# Patient Record
Sex: MALE | Race: WHITE | NOT HISPANIC OR LATINO | Employment: FULL TIME | URBAN - METROPOLITAN AREA
[De-identification: names, ages, dates, MRNs, and addresses within clinical notes are randomized per-mention and may not be internally consistent; named-entity substitution may affect disease eponyms.]

---

## 2017-02-27 ENCOUNTER — TRANSCRIBE ORDERS (OUTPATIENT)
Dept: LAB | Facility: CLINIC | Age: 37
End: 2017-02-27

## 2017-02-27 ENCOUNTER — APPOINTMENT (OUTPATIENT)
Dept: LAB | Facility: CLINIC | Age: 37
End: 2017-02-27
Payer: COMMERCIAL

## 2017-02-27 DIAGNOSIS — D86.2 SARCOIDOSIS OF LUNG WITH SARCOIDOSIS OF LYMPH NODES (HCC): ICD-10-CM

## 2017-02-27 DIAGNOSIS — D86.89 HEERFORDT'S SYNDROME: ICD-10-CM

## 2017-02-27 DIAGNOSIS — E55.9 UNSPECIFIED VITAMIN D DEFICIENCY: ICD-10-CM

## 2017-02-27 DIAGNOSIS — H57.13 PAIN OF BOTH EYES: ICD-10-CM

## 2017-02-27 DIAGNOSIS — L98.9 UNSPECIFIED DISORDER OF SKIN AND SUBCUTANEOUS TISSUE: ICD-10-CM

## 2017-02-27 DIAGNOSIS — D86.2 SARCOIDOSIS OF LUNG WITH SARCOIDOSIS OF LYMPH NODES (HCC): Primary | ICD-10-CM

## 2017-02-27 LAB
25(OH)D3 SERPL-MCNC: 19.5 NG/ML (ref 30–100)
ALBUMIN SERPL BCP-MCNC: 4.1 G/DL (ref 3.5–5)
ALT SERPL W P-5'-P-CCNC: 36 U/L (ref 12–78)
ANION GAP SERPL CALCULATED.3IONS-SCNC: 6 MMOL/L (ref 4–13)
AST SERPL W P-5'-P-CCNC: 18 U/L (ref 5–45)
BASOPHILS # BLD AUTO: 0.08 THOUSANDS/ΜL (ref 0–0.1)
BASOPHILS NFR BLD AUTO: 1 % (ref 0–1)
BUN SERPL-MCNC: 17 MG/DL (ref 5–25)
CALCIUM SERPL-MCNC: 9.6 MG/DL (ref 8.3–10.1)
CHLORIDE SERPL-SCNC: 103 MMOL/L (ref 100–108)
CO2 SERPL-SCNC: 30 MMOL/L (ref 21–32)
CREAT SERPL-MCNC: 0.83 MG/DL (ref 0.6–1.3)
EOSINOPHIL # BLD AUTO: 0.39 THOUSAND/ΜL (ref 0–0.61)
EOSINOPHIL NFR BLD AUTO: 5 % (ref 0–6)
ERYTHROCYTE [DISTWIDTH] IN BLOOD BY AUTOMATED COUNT: 13.8 % (ref 11.6–15.1)
ERYTHROCYTE [SEDIMENTATION RATE] IN BLOOD: 2 MM/HOUR (ref 0–10)
GFR SERPL CREATININE-BSD FRML MDRD: >60 ML/MIN/1.73SQ M
GLUCOSE SERPL-MCNC: 118 MG/DL (ref 65–140)
HCT VFR BLD AUTO: 44.1 % (ref 36.5–49.3)
HGB BLD-MCNC: 14.7 G/DL (ref 12–17)
LYMPHOCYTES # BLD AUTO: 1.73 THOUSANDS/ΜL (ref 0.6–4.47)
LYMPHOCYTES NFR BLD AUTO: 23 % (ref 14–44)
MCH RBC QN AUTO: 31.1 PG (ref 26.8–34.3)
MCHC RBC AUTO-ENTMCNC: 33.3 G/DL (ref 31.4–37.4)
MCV RBC AUTO: 93 FL (ref 82–98)
MONOCYTES # BLD AUTO: 0.9 THOUSAND/ΜL (ref 0.17–1.22)
MONOCYTES NFR BLD AUTO: 12 % (ref 4–12)
NEUTROPHILS # BLD AUTO: 4.55 THOUSANDS/ΜL (ref 1.85–7.62)
NEUTS SEG NFR BLD AUTO: 59 % (ref 43–75)
PLATELET # BLD AUTO: 257 THOUSANDS/UL (ref 149–390)
PMV BLD AUTO: 10.4 FL (ref 8.9–12.7)
POTASSIUM SERPL-SCNC: 4.7 MMOL/L (ref 3.5–5.3)
RBC # BLD AUTO: 4.72 MILLION/UL (ref 3.88–5.62)
SODIUM SERPL-SCNC: 139 MMOL/L (ref 136–145)
WBC # BLD AUTO: 7.65 THOUSAND/UL (ref 4.31–10.16)

## 2017-02-27 PROCEDURE — 82306 VITAMIN D 25 HYDROXY: CPT

## 2017-02-27 PROCEDURE — 84450 TRANSFERASE (AST) (SGOT): CPT

## 2017-02-27 PROCEDURE — 85652 RBC SED RATE AUTOMATED: CPT

## 2017-02-27 PROCEDURE — 82040 ASSAY OF SERUM ALBUMIN: CPT

## 2017-02-27 PROCEDURE — 85025 COMPLETE CBC W/AUTO DIFF WBC: CPT

## 2017-02-27 PROCEDURE — 36415 COLL VENOUS BLD VENIPUNCTURE: CPT

## 2017-02-27 PROCEDURE — 84460 ALANINE AMINO (ALT) (SGPT): CPT

## 2017-02-27 PROCEDURE — 80048 BASIC METABOLIC PNL TOTAL CA: CPT

## 2017-05-03 ENCOUNTER — TRANSCRIBE ORDERS (OUTPATIENT)
Dept: LAB | Facility: CLINIC | Age: 37
End: 2017-05-03

## 2017-05-03 ENCOUNTER — APPOINTMENT (OUTPATIENT)
Dept: LAB | Facility: CLINIC | Age: 37
End: 2017-05-03
Payer: COMMERCIAL

## 2017-05-03 DIAGNOSIS — D86.2 SARCOIDOSIS OF LUNG WITH SARCOIDOSIS OF LYMPH NODES (HCC): ICD-10-CM

## 2017-05-03 DIAGNOSIS — E55.9 UNSPECIFIED VITAMIN D DEFICIENCY: ICD-10-CM

## 2017-05-03 DIAGNOSIS — L98.9 SKIN DISORDER: ICD-10-CM

## 2017-05-03 DIAGNOSIS — D86.89 HEERFORDT'S SYNDROME: ICD-10-CM

## 2017-05-03 DIAGNOSIS — H57.13 OCULAR PAIN, BILATERAL: ICD-10-CM

## 2017-05-03 DIAGNOSIS — D86.2 SARCOIDOSIS OF LUNG WITH SARCOIDOSIS OF LYMPH NODES (HCC): Primary | ICD-10-CM

## 2017-05-03 LAB
ALBUMIN SERPL BCP-MCNC: 3.5 G/DL (ref 3.5–5)
ALT SERPL W P-5'-P-CCNC: 32 U/L (ref 12–78)
ANION GAP SERPL CALCULATED.3IONS-SCNC: 8 MMOL/L (ref 4–13)
AST SERPL W P-5'-P-CCNC: 18 U/L (ref 5–45)
BASOPHILS # BLD AUTO: 0.08 THOUSANDS/ΜL (ref 0–0.1)
BASOPHILS NFR BLD AUTO: 1 % (ref 0–1)
BUN SERPL-MCNC: 12 MG/DL (ref 5–25)
CALCIUM SERPL-MCNC: 9.1 MG/DL (ref 8.3–10.1)
CHLORIDE SERPL-SCNC: 106 MMOL/L (ref 100–108)
CO2 SERPL-SCNC: 29 MMOL/L (ref 21–32)
CREAT SERPL-MCNC: 0.82 MG/DL (ref 0.6–1.3)
EOSINOPHIL # BLD AUTO: 0.78 THOUSAND/ΜL (ref 0–0.61)
EOSINOPHIL NFR BLD AUTO: 10 % (ref 0–6)
ERYTHROCYTE [DISTWIDTH] IN BLOOD BY AUTOMATED COUNT: 13.5 % (ref 11.6–15.1)
ERYTHROCYTE [SEDIMENTATION RATE] IN BLOOD: 5 MM/HOUR (ref 0–10)
GFR SERPL CREATININE-BSD FRML MDRD: >60 ML/MIN/1.73SQ M
GLUCOSE SERPL-MCNC: 70 MG/DL (ref 65–140)
HCT VFR BLD AUTO: 41.6 % (ref 36.5–49.3)
HGB BLD-MCNC: 13.9 G/DL (ref 12–17)
LYMPHOCYTES # BLD AUTO: 2.06 THOUSANDS/ΜL (ref 0.6–4.47)
LYMPHOCYTES NFR BLD AUTO: 27 % (ref 14–44)
MCH RBC QN AUTO: 31.2 PG (ref 26.8–34.3)
MCHC RBC AUTO-ENTMCNC: 33.4 G/DL (ref 31.4–37.4)
MCV RBC AUTO: 94 FL (ref 82–98)
MONOCYTES # BLD AUTO: 1.03 THOUSAND/ΜL (ref 0.17–1.22)
MONOCYTES NFR BLD AUTO: 13 % (ref 4–12)
NEUTROPHILS # BLD AUTO: 3.83 THOUSANDS/ΜL (ref 1.85–7.62)
NEUTS SEG NFR BLD AUTO: 49 % (ref 43–75)
PLATELET # BLD AUTO: 295 THOUSANDS/UL (ref 149–390)
PMV BLD AUTO: 10.1 FL (ref 8.9–12.7)
POTASSIUM SERPL-SCNC: 4 MMOL/L (ref 3.5–5.3)
RBC # BLD AUTO: 4.45 MILLION/UL (ref 3.88–5.62)
SODIUM SERPL-SCNC: 143 MMOL/L (ref 136–145)
WBC # BLD AUTO: 7.78 THOUSAND/UL (ref 4.31–10.16)

## 2017-05-03 PROCEDURE — 84460 ALANINE AMINO (ALT) (SGPT): CPT

## 2017-05-03 PROCEDURE — 85025 COMPLETE CBC W/AUTO DIFF WBC: CPT

## 2017-05-03 PROCEDURE — 80048 BASIC METABOLIC PNL TOTAL CA: CPT

## 2017-05-03 PROCEDURE — 36415 COLL VENOUS BLD VENIPUNCTURE: CPT

## 2017-05-03 PROCEDURE — 84450 TRANSFERASE (AST) (SGOT): CPT

## 2017-05-03 PROCEDURE — 85652 RBC SED RATE AUTOMATED: CPT

## 2017-05-03 PROCEDURE — 82040 ASSAY OF SERUM ALBUMIN: CPT

## 2017-07-11 ENCOUNTER — APPOINTMENT (OUTPATIENT)
Dept: LAB | Facility: CLINIC | Age: 37
End: 2017-07-11
Payer: COMMERCIAL

## 2017-07-11 ENCOUNTER — TRANSCRIBE ORDERS (OUTPATIENT)
Dept: LAB | Facility: CLINIC | Age: 37
End: 2017-07-11

## 2017-07-11 DIAGNOSIS — D86.2 SARCOIDOSIS OF LUNG WITH SARCOIDOSIS OF LYMPH NODES (HCC): ICD-10-CM

## 2017-07-11 DIAGNOSIS — L98.9 SKIN DISEASE: ICD-10-CM

## 2017-07-11 DIAGNOSIS — H57.13 PAIN AROUND EYE, BILATERAL: ICD-10-CM

## 2017-07-11 DIAGNOSIS — E55.9 UNSPECIFIED VITAMIN D DEFICIENCY: ICD-10-CM

## 2017-07-11 DIAGNOSIS — D86.2 SARCOIDOSIS OF LUNG WITH SARCOIDOSIS OF LYMPH NODES (HCC): Primary | ICD-10-CM

## 2017-07-11 DIAGNOSIS — D86.89 SARCOIDOSIS OF OTHER SITES: ICD-10-CM

## 2017-07-11 LAB
ALBUMIN SERPL BCP-MCNC: 3.9 G/DL (ref 3.5–5)
ALT SERPL W P-5'-P-CCNC: 38 U/L (ref 12–78)
ANION GAP SERPL CALCULATED.3IONS-SCNC: 10 MMOL/L (ref 4–13)
AST SERPL W P-5'-P-CCNC: 21 U/L (ref 5–45)
BASOPHILS # BLD AUTO: 0.06 THOUSANDS/ΜL (ref 0–0.1)
BASOPHILS NFR BLD AUTO: 1 % (ref 0–1)
BUN SERPL-MCNC: 14 MG/DL (ref 5–25)
CALCIUM SERPL-MCNC: 9.2 MG/DL (ref 8.3–10.1)
CHLORIDE SERPL-SCNC: 106 MMOL/L (ref 100–108)
CO2 SERPL-SCNC: 28 MMOL/L (ref 21–32)
CREAT SERPL-MCNC: 0.78 MG/DL (ref 0.6–1.3)
EOSINOPHIL # BLD AUTO: 0.31 THOUSAND/ΜL (ref 0–0.61)
EOSINOPHIL NFR BLD AUTO: 4 % (ref 0–6)
ERYTHROCYTE [DISTWIDTH] IN BLOOD BY AUTOMATED COUNT: 13.6 % (ref 11.6–15.1)
ERYTHROCYTE [SEDIMENTATION RATE] IN BLOOD: 3 MM/HOUR (ref 0–10)
GFR SERPL CREATININE-BSD FRML MDRD: >60 ML/MIN/1.73SQ M
GLUCOSE P FAST SERPL-MCNC: 99 MG/DL (ref 65–99)
HCT VFR BLD AUTO: 43.7 % (ref 36.5–49.3)
HGB BLD-MCNC: 14.5 G/DL (ref 12–17)
LYMPHOCYTES # BLD AUTO: 1.68 THOUSANDS/ΜL (ref 0.6–4.47)
LYMPHOCYTES NFR BLD AUTO: 23 % (ref 14–44)
MCH RBC QN AUTO: 31 PG (ref 26.8–34.3)
MCHC RBC AUTO-ENTMCNC: 33.2 G/DL (ref 31.4–37.4)
MCV RBC AUTO: 94 FL (ref 82–98)
MONOCYTES # BLD AUTO: 0.71 THOUSAND/ΜL (ref 0.17–1.22)
MONOCYTES NFR BLD AUTO: 10 % (ref 4–12)
NEUTROPHILS # BLD AUTO: 4.42 THOUSANDS/ΜL (ref 1.85–7.62)
NEUTS SEG NFR BLD AUTO: 62 % (ref 43–75)
PLATELET # BLD AUTO: 233 THOUSANDS/UL (ref 149–390)
PMV BLD AUTO: 10.6 FL (ref 8.9–12.7)
POTASSIUM SERPL-SCNC: 4.3 MMOL/L (ref 3.5–5.3)
RBC # BLD AUTO: 4.67 MILLION/UL (ref 3.88–5.62)
SODIUM SERPL-SCNC: 144 MMOL/L (ref 136–145)
WBC # BLD AUTO: 7.18 THOUSAND/UL (ref 4.31–10.16)

## 2017-07-11 PROCEDURE — 85652 RBC SED RATE AUTOMATED: CPT

## 2017-07-11 PROCEDURE — 82040 ASSAY OF SERUM ALBUMIN: CPT

## 2017-07-11 PROCEDURE — 80048 BASIC METABOLIC PNL TOTAL CA: CPT

## 2017-07-11 PROCEDURE — 85025 COMPLETE CBC W/AUTO DIFF WBC: CPT

## 2017-07-11 PROCEDURE — 84460 ALANINE AMINO (ALT) (SGPT): CPT

## 2017-07-11 PROCEDURE — 84450 TRANSFERASE (AST) (SGOT): CPT

## 2017-07-11 PROCEDURE — 36415 COLL VENOUS BLD VENIPUNCTURE: CPT

## 2017-07-18 ENCOUNTER — ALLSCRIPTS OFFICE VISIT (OUTPATIENT)
Dept: OTHER | Facility: OTHER | Age: 37
End: 2017-07-18

## 2017-07-24 ENCOUNTER — GENERIC CONVERSION - ENCOUNTER (OUTPATIENT)
Dept: OTHER | Facility: OTHER | Age: 37
End: 2017-07-24

## 2017-11-21 ENCOUNTER — TRANSCRIBE ORDERS (OUTPATIENT)
Dept: ADMINISTRATIVE | Facility: HOSPITAL | Age: 37
End: 2017-11-21

## 2017-11-21 DIAGNOSIS — D86.9 SARCOIDOSIS: Primary | ICD-10-CM

## 2017-11-28 ENCOUNTER — HOSPITAL ENCOUNTER (OUTPATIENT)
Dept: CT IMAGING | Facility: HOSPITAL | Age: 37
Discharge: HOME | End: 2017-11-28
Payer: COMMERCIAL

## 2017-11-28 DIAGNOSIS — D86.9 SARCOIDOSIS: ICD-10-CM

## 2017-11-28 PROCEDURE — 71250 CT THORAX DX C-: CPT

## 2017-12-12 ENCOUNTER — APPOINTMENT (OUTPATIENT)
Dept: LAB | Facility: CLINIC | Age: 37
End: 2017-12-12
Payer: COMMERCIAL

## 2017-12-12 ENCOUNTER — TRANSCRIBE ORDERS (OUTPATIENT)
Dept: LAB | Facility: CLINIC | Age: 37
End: 2017-12-12

## 2017-12-12 DIAGNOSIS — H57.13 PAIN OF BOTH EYES: ICD-10-CM

## 2017-12-12 DIAGNOSIS — E55.9 AVITAMINOSIS D: ICD-10-CM

## 2017-12-12 DIAGNOSIS — L98.9 FIBROHISTIOCYTIC PROLIFERATION OF THE SKIN: ICD-10-CM

## 2017-12-12 DIAGNOSIS — D86.2 SARCOIDOSIS OF LUNG WITH SARCOIDOSIS OF LYMPH NODES (HCC): Primary | ICD-10-CM

## 2017-12-12 DIAGNOSIS — D86.2 SARCOIDOSIS OF LUNG WITH SARCOIDOSIS OF LYMPH NODES (HCC): ICD-10-CM

## 2017-12-12 DIAGNOSIS — D86.89 HEERFORDT'S SYNDROME: ICD-10-CM

## 2017-12-12 LAB
ALBUMIN SERPL BCP-MCNC: 3.7 G/DL (ref 3.5–5)
ALT SERPL W P-5'-P-CCNC: 46 U/L (ref 12–78)
ANION GAP SERPL CALCULATED.3IONS-SCNC: 5 MMOL/L (ref 4–13)
AST SERPL W P-5'-P-CCNC: 26 U/L (ref 5–45)
BASOPHILS # BLD AUTO: 0.07 THOUSANDS/ΜL (ref 0–0.1)
BASOPHILS NFR BLD AUTO: 1 % (ref 0–1)
BUN SERPL-MCNC: 13 MG/DL (ref 5–25)
CALCIUM SERPL-MCNC: 9.2 MG/DL (ref 8.3–10.1)
CHLORIDE SERPL-SCNC: 105 MMOL/L (ref 100–108)
CO2 SERPL-SCNC: 30 MMOL/L (ref 21–32)
CREAT SERPL-MCNC: 0.86 MG/DL (ref 0.6–1.3)
EOSINOPHIL # BLD AUTO: 0.47 THOUSAND/ΜL (ref 0–0.61)
EOSINOPHIL NFR BLD AUTO: 6 % (ref 0–6)
ERYTHROCYTE [DISTWIDTH] IN BLOOD BY AUTOMATED COUNT: 13.4 % (ref 11.6–15.1)
ERYTHROCYTE [SEDIMENTATION RATE] IN BLOOD: 3 MM/HOUR (ref 0–10)
GFR SERPL CREATININE-BSD FRML MDRD: 110 ML/MIN/1.73SQ M
GLUCOSE P FAST SERPL-MCNC: 106 MG/DL (ref 65–99)
HCT VFR BLD AUTO: 40.6 % (ref 36.5–49.3)
HGB BLD-MCNC: 14.2 G/DL (ref 12–17)
LYMPHOCYTES # BLD AUTO: 1.99 THOUSANDS/ΜL (ref 0.6–4.47)
LYMPHOCYTES NFR BLD AUTO: 27 % (ref 14–44)
MCH RBC QN AUTO: 31.4 PG (ref 26.8–34.3)
MCHC RBC AUTO-ENTMCNC: 35 G/DL (ref 31.4–37.4)
MCV RBC AUTO: 90 FL (ref 82–98)
MONOCYTES # BLD AUTO: 1.06 THOUSAND/ΜL (ref 0.17–1.22)
MONOCYTES NFR BLD AUTO: 14 % (ref 4–12)
NEUTROPHILS # BLD AUTO: 3.81 THOUSANDS/ΜL (ref 1.85–7.62)
NEUTS SEG NFR BLD AUTO: 52 % (ref 43–75)
PLATELET # BLD AUTO: 246 THOUSANDS/UL (ref 149–390)
PMV BLD AUTO: 10.5 FL (ref 8.9–12.7)
POTASSIUM SERPL-SCNC: 4.5 MMOL/L (ref 3.5–5.3)
RBC # BLD AUTO: 4.52 MILLION/UL (ref 3.88–5.62)
SODIUM SERPL-SCNC: 140 MMOL/L (ref 136–145)
WBC # BLD AUTO: 7.4 THOUSAND/UL (ref 4.31–10.16)

## 2017-12-12 PROCEDURE — 84460 ALANINE AMINO (ALT) (SGPT): CPT

## 2017-12-12 PROCEDURE — 36415 COLL VENOUS BLD VENIPUNCTURE: CPT

## 2017-12-12 PROCEDURE — 85025 COMPLETE CBC W/AUTO DIFF WBC: CPT

## 2017-12-12 PROCEDURE — 82040 ASSAY OF SERUM ALBUMIN: CPT

## 2017-12-12 PROCEDURE — 80048 BASIC METABOLIC PNL TOTAL CA: CPT

## 2017-12-12 PROCEDURE — 85652 RBC SED RATE AUTOMATED: CPT

## 2017-12-12 PROCEDURE — 84450 TRANSFERASE (AST) (SGOT): CPT

## 2018-01-12 NOTE — RESULT NOTES
Verified Results  (1) LYME ANTIBODY PROFILE W/REFLEX TO WESTERN BLOT [id number] 02:21PM [facility]     Test Name Result Flag Reference   LYME IGG 0 08  0 00-0 79   NEGATIVE(0 00-0 79)-Absence of detectable Borrelia IgG Antibodies  A negative result does not exclude the possibility of Borrelia infection  If early Lyme disease is suspected,a second sample should be collected & tested 4 weeks after initial testing  LYME IGM 0 34  0 00-0 79   NEGATIVE (0 00-0 79)-Absence of detectable Borrelia IgM antibodies  A negative result does not exclude the possibility of Borrelia infection  If early lyme disease is suspected, a second sample should be collected & tested 4 weeks after initial testing

## 2018-01-14 VITALS
HEART RATE: 110 BPM | BODY MASS INDEX: 28.06 KG/M2 | RESPIRATION RATE: 18 BRPM | DIASTOLIC BLOOD PRESSURE: 80 MMHG | WEIGHT: 196 LBS | SYSTOLIC BLOOD PRESSURE: 132 MMHG | TEMPERATURE: 98.2 F | OXYGEN SATURATION: 99 % | HEIGHT: 70 IN

## 2018-01-23 NOTE — PROGRESS NOTES
Assessment    1  History of Bronchoscopy (Diagnostic)   2  Sarcoidosis (135) (D86 9)    Plan   Follow-up visit in 6 months Evaluation and Treatment  Follow-up  Status: Hold For - Scheduling  Requested for: [id number]  Ordered; For: Sarcoidosis; Ordered By: Skye Jose  Performed:   Due: [id number]     Results/Data  PFT Results v2:     Spirometry: Forced vital capacity:   Post Bronchodilator Spirometry:   Lung Volumes:   DLCO:       Discussion/Summary  Discussion Summary:   Asha is here today for follow-up for sarcoidosis  He was seen at Dr Brenda sal office on October 27 for hilar adenopathy recommended was flexible bronchoscopy E this for which he underwent  Fine-needle aspiration was done on November 15 via bronchoscopy  Results revealed rare non -caseating granulomas in a background of benign bronchial cells  Asha denies any shortness of breath he does have recent cough for which she received reports having a cold  Cough is mostly nonproductive  Patient did undergo complete PFTs  FVC was 4 93 L or 92% of predicted  FEV1 3 94 L or 91% of predicted  Obstruction ratio was 80% total lung capacity was 6 07 L or 87% of predicted residual volume 1 20 L or 67% of predicted final interpretation of complete PFTs was done on October 27 for which spirometry was within normal limits  Lung volume loop within normal limits  And diffusion capacity is normal     Asha is stable he will return to the office in 6 months he is also recommended to contact off it if he has any cough hemoptysis or increased shortness of breath room air oxygen was 98% and remained the same with ambulation  Goals and Barriers: The patient has the current Goals: Mr Carley Casanova will contact office if he develops severe cough, hemoptysis or SOB  None  Patient's Capacity to Self-Care: Patient is able to Self-Care  Medication SE Review and Pt Understands Tx: The treatment plan was reviewed with the patient/guardian   The patient/guardian understands and agrees with the treatment plan   Counseling Documentation With Imm: The patient was counseled regarding  Active Problems    1  ADHD (attention deficit hyperactivity disorder) (314 01) (F90 9)   2  Cellulitis of eyelid (373 13) (H00 039)   3  Hilar adenopathy (785 6) (R59 0)    Chief Complaint  Chief Complaint Free Text Note Form: F/U BIOPSY   Chief Complaint Chronic Condition [facility]: Patient is here today for follow up of chronic conditions described in HPI  History of Present Illness  HPI: Mr Omar Yousif is a 39year old male who presents today for follow up for hilar adenopathy  Mr Omar Yousif was seen by Dr Nathalie Seth on October 19, 2016 for diffuse mediastinal and hilar adenopathy he had had swelling of the right eyelid which was biopsied and found to have granulomas he was referred to Dr Nathalie Seth and had initial appointment; Mr Omar Yousif went on for flexible bronchoscopy and even this with transbronchial needle aspirations on 11/15/2016  This was done by Dr Melissa Tucker  Results revealed non -caseating granulomas seen in the background of benign bronchial cells  Dayanna Limon does not have SOB or cough  He underwent complete PFT that were normal       Review of Systems  Complete-Male Pulm:   The patient presents with complaints of gradual onset of intermittent episodes of mild cough, described as loose  His symptoms are caused by no known event  Symptoms are unchanged  Past Medical History    1  History of Cough (786 2) (R05)    Surgical History    1  History of Biopsy Eyelid   2  History of Bronchoscopy (Diagnostic)   3  History of Bronchoscopy (Therap) W/ EBUS Guid Transendoscopic, For Periph Lesions   4  History of Flexible Bronchoscopy (Diagnostic)  Surgical History Reviewed: The surgical history was reviewed and updated today  Family History  Mother    1  No pertinent family history  Family History Reviewed: The family history was reviewed and updated today         Social History    · Alcohol use (V49 89) (Z78 9)   · Never a smoker   · Occupation   · Denied: History of Recreational drug use   · Single  Social History Reviewed: The social history was reviewed and updated today  The social history was reviewed and is unchanged  Current Meds   1  Folic Acid 1 MG Oral Tablet Recorded   2  Methotrexate TABS Recorded   3  PredniSONE 10 MG Oral Tablet Recorded   4  Vitamin D (Ergocalciferol) 71907 UNIT Oral Capsule Recorded   5  Vyvanse 70 MG Oral Capsule; takes 2x daily; Therapy: (Recorded:19Oct2016) to Recorded  Medication List Reviewed: The medication list was reviewed and updated today  Allergies    1  No Known Drug Allergies    Vitals  Vital Signs    Recorded: 56Tuj0289 09:30AM   Temperature 97 8 F, Oral   Heart Rate 90   Pulse Quality Normal   Respiration Quality Normal   Respiration 16   Systolic 955, RUE, Sitting   Diastolic 88, RUE, Sitting   Height 5 ft 10 in   Weight 203 lb    BMI Calculated 29 13   BSA Calculated 2 1   O2 Saturation 98     Physical Exam    Constitutional   General appearance: No acute distress, well appearing and well nourished  Eyes   Examination of pupil and irises: Abnormal Cornea, Lens, and Sclera: Right eyelid edematous  Ears, Nose, Mouth, and Throat   External inspection of ears and nose: Normal     Nasal mucosa, septum, and turbinates: Normal without edema or erythema  Lips, teeth, and gums: Normal, good dentition  Oropharynx: Normal with no erythema, edema, exudate or lesions  Mallampati Classification: 3  Neck   Neck: Supple, symmetric, trachea midline, no masses  Jugular veins: Normal     Pulmonary   Chest: Normal     Respiratory effort: No increased work of breathing or signs of respiratory distress  Auscultation of lungs: Clear to auscultation, no rales, no crackles, no wheezing  Cardiovascular   Auscultation of heart: Normal rate and rhythm, normal S1 and S2, no murmurs  Carotid pulses: 2+ bilaterally      Pedal pulses: 2+ bilaterally  Examination of extremities for edema and/or varicosities: Normal     Abdomen   Abdomen: Soft, non-tender  Lymphatic   Palpation of lymph nodes in neck: No lymphadenopathy  Musculoskeletal   Gait and station: Normal     Digits and nails: Normal without clubbing or cyanosis  Neurologic   Mental Status: Normal  Not confused, no evidence of dementia, good comprehension, good concentration  Skin   Skin and subcutaneous tissue: Limited exam shows no rash  Psychiatric   Orientation to person, place and time: Normal     Mood and affect: Normal        Future Appointments    Date/Time Provider Specialty Site   06/15/2017 09:00 AM KRISTINA Rodriguez   Pulmonary Medicine [facility] [address]     Signatures   Electronically signed by : MIKA Adamson; Dec 13 2016 10:06AM EST                       (Author)    Electronically signed by : MIKA Adamson; Dec 13 2016 10:09AM EST                       (Author)    Electronically signed by : RISHI Cabral ; Dec 22 2016 10:12PM EST                       (Author)

## 2018-04-30 ENCOUNTER — APPOINTMENT (OUTPATIENT)
Dept: LAB | Facility: CLINIC | Age: 38
End: 2018-04-30
Payer: COMMERCIAL

## 2018-04-30 ENCOUNTER — TRANSCRIBE ORDERS (OUTPATIENT)
Dept: LAB | Facility: CLINIC | Age: 38
End: 2018-04-30

## 2018-04-30 DIAGNOSIS — D86.2 SARCOIDOSIS OF LUNG WITH SARCOIDOSIS OF LYMPH NODES (HCC): Primary | ICD-10-CM

## 2018-04-30 DIAGNOSIS — D86.2 SARCOIDOSIS OF LUNG WITH SARCOIDOSIS OF LYMPH NODES (HCC): ICD-10-CM

## 2018-04-30 LAB
ALBUMIN SERPL BCP-MCNC: 3.9 G/DL (ref 3.5–5)
ALP SERPL-CCNC: 58 U/L (ref 46–116)
ALT SERPL W P-5'-P-CCNC: 50 U/L (ref 12–78)
ANION GAP SERPL CALCULATED.3IONS-SCNC: 7 MMOL/L (ref 4–13)
AST SERPL W P-5'-P-CCNC: 29 U/L (ref 5–45)
BASOPHILS # BLD AUTO: 0.05 THOUSANDS/ΜL (ref 0–0.1)
BASOPHILS NFR BLD AUTO: 1 % (ref 0–1)
BILIRUB SERPL-MCNC: 0.4 MG/DL (ref 0.2–1)
BUN SERPL-MCNC: 13 MG/DL (ref 5–25)
CALCIUM SERPL-MCNC: 9.1 MG/DL (ref 8.3–10.1)
CHLORIDE SERPL-SCNC: 104 MMOL/L (ref 100–108)
CO2 SERPL-SCNC: 28 MMOL/L (ref 21–32)
CREAT SERPL-MCNC: 0.72 MG/DL (ref 0.6–1.3)
EOSINOPHIL # BLD AUTO: 0.13 THOUSAND/ΜL (ref 0–0.61)
EOSINOPHIL NFR BLD AUTO: 2 % (ref 0–6)
ERYTHROCYTE [DISTWIDTH] IN BLOOD BY AUTOMATED COUNT: 13.4 % (ref 11.6–15.1)
ERYTHROCYTE [SEDIMENTATION RATE] IN BLOOD: 4 MM/HOUR (ref 0–10)
GFR SERPL CREATININE-BSD FRML MDRD: 118 ML/MIN/1.73SQ M
GLUCOSE P FAST SERPL-MCNC: 111 MG/DL (ref 65–99)
HCT VFR BLD AUTO: 42.7 % (ref 36.5–49.3)
HGB BLD-MCNC: 14.3 G/DL (ref 12–17)
LYMPHOCYTES # BLD AUTO: 1.65 THOUSANDS/ΜL (ref 0.6–4.47)
LYMPHOCYTES NFR BLD AUTO: 26 % (ref 14–44)
MCH RBC QN AUTO: 30.4 PG (ref 26.8–34.3)
MCHC RBC AUTO-ENTMCNC: 33.5 G/DL (ref 31.4–37.4)
MCV RBC AUTO: 91 FL (ref 82–98)
MONOCYTES # BLD AUTO: 0.92 THOUSAND/ΜL (ref 0.17–1.22)
MONOCYTES NFR BLD AUTO: 15 % (ref 4–12)
NEUTROPHILS # BLD AUTO: 3.51 THOUSANDS/ΜL (ref 1.85–7.62)
NEUTS SEG NFR BLD AUTO: 56 % (ref 43–75)
PLATELET # BLD AUTO: 238 THOUSANDS/UL (ref 149–390)
PMV BLD AUTO: 10.3 FL (ref 8.9–12.7)
POTASSIUM SERPL-SCNC: 4.1 MMOL/L (ref 3.5–5.3)
PROT SERPL-MCNC: 7.3 G/DL (ref 6.4–8.2)
RBC # BLD AUTO: 4.71 MILLION/UL (ref 3.88–5.62)
SODIUM SERPL-SCNC: 139 MMOL/L (ref 136–145)
WBC # BLD AUTO: 6.26 THOUSAND/UL (ref 4.31–10.16)

## 2018-04-30 PROCEDURE — 80053 COMPREHEN METABOLIC PANEL: CPT

## 2018-04-30 PROCEDURE — 85652 RBC SED RATE AUTOMATED: CPT

## 2018-04-30 PROCEDURE — 85025 COMPLETE CBC W/AUTO DIFF WBC: CPT

## 2018-04-30 PROCEDURE — 36415 COLL VENOUS BLD VENIPUNCTURE: CPT

## 2018-07-13 ENCOUNTER — TRANSCRIBE ORDERS (OUTPATIENT)
Dept: LAB | Facility: CLINIC | Age: 38
End: 2018-07-13

## 2018-07-13 ENCOUNTER — APPOINTMENT (OUTPATIENT)
Dept: LAB | Facility: CLINIC | Age: 38
End: 2018-07-13
Payer: COMMERCIAL

## 2018-07-13 DIAGNOSIS — Z00.8 HEALTH EXAMINATION IN POPULATION SURVEY: Primary | ICD-10-CM

## 2018-07-13 DIAGNOSIS — Z00.8 HEALTH EXAMINATION IN POPULATION SURVEY: ICD-10-CM

## 2018-07-13 LAB
CHOLEST SERPL-MCNC: 195 MG/DL (ref 50–200)
EST. AVERAGE GLUCOSE BLD GHB EST-MCNC: 105 MG/DL
HBA1C MFR BLD: 5.3 % (ref 4.2–6.3)
HDLC SERPL-MCNC: 100 MG/DL (ref 40–60)
LDLC SERPL CALC-MCNC: 74 MG/DL (ref 0–100)
NONHDLC SERPL-MCNC: 95 MG/DL
TRIGL SERPL-MCNC: 105 MG/DL

## 2018-07-13 PROCEDURE — 80061 LIPID PANEL: CPT

## 2018-07-13 PROCEDURE — 36415 COLL VENOUS BLD VENIPUNCTURE: CPT

## 2018-07-13 PROCEDURE — 83036 HEMOGLOBIN GLYCOSYLATED A1C: CPT

## 2018-09-18 ENCOUNTER — OFFICE VISIT (OUTPATIENT)
Dept: MULTI SPECIALTY CLINIC | Facility: CLINIC | Age: 38
End: 2018-09-18

## 2018-09-18 VITALS
DIASTOLIC BLOOD PRESSURE: 88 MMHG | BODY MASS INDEX: 28.41 KG/M2 | SYSTOLIC BLOOD PRESSURE: 128 MMHG | TEMPERATURE: 98.2 F | HEIGHT: 70 IN | WEIGHT: 198.41 LBS | HEART RATE: 98 BPM

## 2018-09-18 DIAGNOSIS — Z71.84 TRAVEL ADVICE ENCOUNTER: Primary | ICD-10-CM

## 2018-09-18 DIAGNOSIS — Z23 NEED FOR IMMUNIZATION AGAINST TYPHOID: ICD-10-CM

## 2018-09-18 DIAGNOSIS — Z23 NEED FOR HEPATITIS A IMMUNIZATION: ICD-10-CM

## 2018-09-18 PROCEDURE — 90471 IMMUNIZATION ADMIN: CPT | Performed by: INTERNAL MEDICINE

## 2018-09-18 PROCEDURE — 90632 HEPA VACCINE ADULT IM: CPT | Performed by: INTERNAL MEDICINE

## 2018-09-18 PROCEDURE — 90472 IMMUNIZATION ADMIN EACH ADD: CPT | Performed by: INTERNAL MEDICINE

## 2018-09-18 PROCEDURE — 90691 TYPHOID VACCINE IM: CPT | Performed by: INTERNAL MEDICINE

## 2018-09-18 PROCEDURE — 99411 PREVENTIVE COUNSELING GROUP: CPT | Performed by: INTERNAL MEDICINE

## 2018-09-18 RX ORDER — DEXTROAMPHETAMINE SACCHARATE, AMPHETAMINE ASPARTATE MONOHYDRATE, DEXTROAMPHETAMINE SULFATE AND AMPHETAMINE SULFATE 7.5; 7.5; 7.5; 7.5 MG/1; MG/1; MG/1; MG/1
30 CAPSULE, EXTENDED RELEASE ORAL
COMMUNITY
Start: 2018-08-23 | End: 2021-08-24

## 2018-09-18 NOTE — PROGRESS NOTES
Travel Clinic    Patient is traveling to countries or areas within countries where immunizations are required or recommended:   [place], non-malaria area    Patient states they will visit underdeveloped areas with poor sanitation Yes/No: Yes   Patient requires malaria prophylaxis Yes/No: No    No orders of the defined types were placed in this encounter    Patient is immunosuppressed with methotrexate    Patient instructed how to take medications Yes/No: Yes  Patient warned about side effects Yes/No: Yes  Patient declined Yes/No: No

## 2018-11-14 ENCOUNTER — OFFICE VISIT (OUTPATIENT)
Dept: INTERNAL MEDICINE CLINIC | Facility: CLINIC | Age: 38
End: 2018-11-14
Payer: COMMERCIAL

## 2018-11-14 VITALS
TEMPERATURE: 98.1 F | RESPIRATION RATE: 16 BRPM | DIASTOLIC BLOOD PRESSURE: 80 MMHG | HEART RATE: 100 BPM | OXYGEN SATURATION: 97 % | SYSTOLIC BLOOD PRESSURE: 122 MMHG | WEIGHT: 198.6 LBS | HEIGHT: 70 IN | BODY MASS INDEX: 28.43 KG/M2

## 2018-11-14 DIAGNOSIS — E66.3 OVERWEIGHT (BMI 25.0-29.9): ICD-10-CM

## 2018-11-14 DIAGNOSIS — Z23 NEED FOR TDAP VACCINATION: ICD-10-CM

## 2018-11-14 DIAGNOSIS — Z23 NEED FOR INFLUENZA VACCINATION: ICD-10-CM

## 2018-11-14 DIAGNOSIS — Z00.00 ANNUAL PHYSICAL EXAM: Primary | ICD-10-CM

## 2018-11-14 PROBLEM — H02.401 PTOSIS, RIGHT: Status: ACTIVE | Noted: 2017-07-18

## 2018-11-14 PROCEDURE — 90715 TDAP VACCINE 7 YRS/> IM: CPT

## 2018-11-14 PROCEDURE — 90471 IMMUNIZATION ADMIN: CPT

## 2018-11-14 PROCEDURE — 99385 PREV VISIT NEW AGE 18-39: CPT | Performed by: INTERNAL MEDICINE

## 2018-11-14 PROCEDURE — 90472 IMMUNIZATION ADMIN EACH ADD: CPT

## 2018-11-14 PROCEDURE — 90686 IIV4 VACC NO PRSV 0.5 ML IM: CPT

## 2018-11-14 NOTE — PATIENT INSTRUCTIONS
Wellness Visit for Adults   AMBULATORY CARE:   A wellness visit  is when you see your healthcare provider to get screened for health problems  You can also get advice on how to stay healthy  Write down your questions so you remember to ask them  Ask your healthcare provider how often you should have a wellness visit  What happens at a wellness visit:  Your healthcare provider will ask about your health, and your family history of health problems  This includes high blood pressure, heart disease, and cancer  He or she will ask if you have symptoms that concern you, if you smoke, and about your mood  You may also be asked about your intake of medicines, supplements, food, and alcohol  Any of the following may be done:  · Your weight  will be checked  Your height may also be checked so your body mass index (BMI) can be calculated  Your BMI shows if you are at a healthy weight  · Your blood pressure  and heart rate will be checked  Your temperature may also be checked  · Blood and urine tests  may be done  Blood tests may be done to check your cholesterol levels  Abnormal cholesterol levels increase your risk for heart disease and stroke  You may also need a blood or urine test to check for diabetes if you are at increased risk  Urine tests may be done to look for signs of an infection or kidney disease  · A physical exam  includes checking your heartbeat and lungs with a stethoscope  Your healthcare provider may also check your skin to look for sun damage  · Screening tests  may be recommended  A screening test is done to check for diseases that may not cause symptoms  The screening tests you may need depend on your age, gender, family history, and lifestyle habits  For example, colorectal screening may be recommended if you are 48years old or older  Screening tests you need if you are a woman:   · A Pap smear  is used to screen for cervical cancer   Pap smears are usually done every 3 to 5 years depending on your age  You may need them more often if you have had abnormal Pap smear test results in the past  Ask your healthcare provider how often you should have a Pap smear  · A mammogram  is an x-ray of your breasts to screen for breast cancer  Experts recommend mammograms every 2 years starting at age 48 years  You may need a mammogram at age 52 years or younger if you have an increased risk for breast cancer  Talk to your healthcare provider about when you should start having mammograms and how often you need them  Vaccines you may need:   · Get an influenza vaccine  every year  The influenza vaccine protects you from the flu  Several types of viruses cause the flu  The viruses change over time, so new vaccines are made each year  · Get a tetanus-diphtheria (Td) booster vaccine  every 10 years  This vaccine protects you against tetanus and diphtheria  Tetanus is a severe infection that may cause painful muscle spasms and lockjaw  Diphtheria is a severe bacterial infection that causes a thick covering in the back of your mouth and throat  · Get a human papillomavirus (HPV) vaccine  if you are female and aged 23 to 32 or male 23 to 24 and never received it  This vaccine protects you from HPV infection  HPV is the most common infection spread by sexual contact  HPV may also cause vaginal, penile, and anal cancers  · Get a pneumococcal vaccine  if you are aged 72 years or older  The pneumococcal vaccine is an injection given to protect you from pneumococcal disease  Pneumococcal disease is an infection caused by pneumococcal bacteria  The infection may cause pneumonia, meningitis, or an ear infection  · Get a shingles vaccine  if you are aged 61 or older, even if you have had shingles before  The shingles vaccine is an injection to protect you from the varicella-zoster virus  This is the same virus that causes chickenpox   Shingles is a painful rash that develops in people who had chickenpox or have been exposed to the virus  How to eat healthy:  My Plate is a model for planning healthy meals  It shows the types and amounts of foods that should go on your plate  Fruits and vegetables make up about half of your plate, and grains and protein make up the other half  A serving of dairy is included on the side of your plate  The amount of calories and serving sizes you need depends on your age, gender, weight, and height  Examples of healthy foods are listed below:  · Eat a variety of vegetables  such as dark green, red, and orange vegetables  You can also include canned vegetables low in sodium (salt) and frozen vegetables without added butter or sauces  · Eat a variety of fresh fruits , canned fruit in 100% juice, frozen fruit, and dried fruit  · Include whole grains  At least half of the grains you eat should be whole grains  Examples include whole-wheat bread, wheat pasta, brown rice, and whole-grain cereals such as oatmeal     · Eat a variety of protein foods such as seafood (fish and shellfish), lean meat, and poultry without skin (turkey and chicken)  Examples of lean meats include pork leg, shoulder, or tenderloin, and beef round, sirloin, tenderloin, and extra lean ground beef  Other protein foods include eggs and egg substitutes, beans, peas, soy products, nuts, and seeds  · Choose low-fat dairy products such as skim or 1% milk or low-fat yogurt, cheese, and cottage cheese  · Limit unhealthy fats  such as butter, hard margarine, and shortening  Exercise:  Exercise at least 30 minutes per day on most days of the week  Some examples of exercise include walking, biking, dancing, and swimming  You can also fit in more physical activity by taking the stairs instead of the elevator or parking farther away from stores  Include muscle strengthening activities 2 days each week  Regular exercise provides many health benefits   It helps you manage your weight, and decreases your risk for type 2 diabetes, heart disease, stroke, and high blood pressure  Exercise can also help improve your mood  Ask your healthcare provider about the best exercise plan for you  General health and safety guidelines:   · Do not smoke  Nicotine and other chemicals in cigarettes and cigars can cause lung damage  Ask your healthcare provider for information if you currently smoke and need help to quit  E-cigarettes or smokeless tobacco still contain nicotine  Talk to your healthcare provider before you use these products  · Limit alcohol  A drink of alcohol is 12 ounces of beer, 5 ounces of wine, or 1½ ounces of liquor  · Lose weight, if needed  Being overweight increases your risk of certain health conditions  These include heart disease, high blood pressure, type 2 diabetes, and certain types of cancer  · Protect your skin  Do not sunbathe or use tanning beds  Use sunscreen with a SPF 15 or higher  Apply sunscreen at least 15 minutes before you go outside  Reapply sunscreen every 2 hours  Wear protective clothing, hats, and sunglasses when you are outside  · Drive safely  Always wear your seatbelt  Make sure everyone in your car wears a seatbelt  A seatbelt can save your life if you are in an accident  Do not use your cell phone when you are driving  This could distract you and cause an accident  Pull over if you need to make a call or send a text message  · Practice safe sex  Use latex condoms if are sexually active and have more than one partner  Your healthcare provider may recommend screening tests for sexually transmitted infections (STIs)  · Wear helmets, lifejackets, and protective gear  Always wear a helmet when you ride a bike or motorcycle, go skiing, or play sports that could cause a head injury  Wear protective equipment when you play sports  Wear a lifejacket when you are on a boat or doing water sports    © 2017 [address] [employer] Information is for End User's use only and may not be sold, redistributed or otherwise used for commercial purposes  All illustrations and images included in CareNotes® are the copyrighted property of [employer]  or [employer]  The above information is an  only  It is not intended as medical advice for individual conditions or treatments  Talk to your doctor, nurse or pharmacist before following any medical regimen to see if it is safe and effective for you  Cholesterol and Your Health   AMBULATORY CARE:   Cholesterol  is a waxy, fat-like substance  Cholesterol is made by your body, but also comes from certain foods you eat  Your body uses cholesterol to make hormones and new cells  Your body also uses cholesterol to protect nerves  Cholesterol comes from foods such as meat and dairy products  Your total cholesterol level is made up by LDL cholesterol, HDL cholesterol, and triglycerides:  · LDL cholesterol  is called bad cholesterol  because it forms plaque in your arteries  As plaque builds up, your arteries become narrow, and less blood flows through  When plaque decreases blood flow to your heart, you may have chest pain  If plaque completely blocks an artery that bring blood to your heart, you may have a heart attack  Plaque can break off and form blood clots  Blood clots may block arteries in your brain and cause a stroke  · HDL cholesterol  is called good cholesterol  because it helps remove LDL cholesterol from your arteries  It does this by attaching to LDL cholesterol and carrying it to your liver  Your liver breaks down LDL cholesterol so your body can get rid of it  High levels of HDL cholesterol can help prevent a heart attack and stroke  Low levels of HDL cholesterol can increase your risk for heart disease, heart attack, and stroke  · Triglycerides  are a type of fat that store energy from foods you eat  High levels of triglycerides also cause plaque buildup   This can increase your risk for a heart attack or stroke  If your triglyceride level is high, your LDL cholesterol level may also be high  How food affects your cholesterol levels:   · Unhealthy fats  increase LDL cholesterol and triglyceride levels in your blood  They are found in foods high in cholesterol, saturated fat, and trans fat:     ¨ Cholesterol  is found in eggs, dairy, and meat  ¨ Saturated fat  is found in butter, cheese, ice cream, whole milk, and coconut oil  Saturated fat is also found in meat, such as sausage, hot dogs, and bologna  ¨ Trans fat  is found in liquid oils and is used in fried and baked foods  Foods that contain trans fats include chips, crackers, muffins, sweet rolls, microwave popcorn, and cookies  · Healthy fats,  also called unsaturated fats, help lower LDL cholesterol and triglyceride levels  Healthy fats include the following:     ¨ Monounsaturated fats  are found in foods such as olive oil, canola oil, avocado, nuts, and olives  ¨ Polyunsaturated fats,  such as omega 3 fats, are found in fish, such as salmon, trout, and tuna  They can also be found in plant foods such as flaxseed, walnuts, and soybeans  Other things that affect your cholesterol levels:   · Smoking cigarettes    · Being overweight or obese     · Drinking large amounts of alcohol    · Not enough exercise or no exercise    · Certain genes passed from your parents to you  What you need to know about having your cholesterol levels checked: Adults 21to 39years of age should have their cholesterol levels checked every 4 to 6 years  Adults 45 years and older should have their cholesterol checked every 1 to 2 years  You may need your cholesterol checked more often, or at a younger age, if you have risk factors for heart disease  You may also need to have your cholesterol checked more often if you have other health conditions, such as diabetes  Blood tests are used to check cholesterol levels   Blood tests measure your levels of triglycerides, LDL cholesterol, and HDL cholesterol  Cholesterol level goals: Your cholesterol level goal may depend on your risk for heart disease  It may also depend on your age and other health conditions  Ask your healthcare provider if the following goals are right for you:  · Your total cholesterol level  should be less than 200 mg/dL  This number may also depend on your HDL and LDL cholesterol goals  · Your LDL cholesterol level  should be less than 130 mg/dL  · Your HDL cholesterol level  should be 60 mg/dL or higher  · Your triglyceride level  should be less than 150 mg/dL  Treatment for high cholesterol:  Treatment for high cholesterol will also decrease your risk of heart disease, heart attack, and stroke  Treatment may include any of the following:  · Medicines  may be given to lower your LDL cholesterol, triglyceride levels, or total cholesterol level  You may need medicines to lower your cholesterol if any of the following is true:     ¨ You have a history of stroke, TIA, unstable angina, or a heart attack    ¨ Your LDL cholesterol level is 190 mg/dL or higher    ¨ You are age 36to 76years of age, have diabetes, and your LDL cholesterol is 70 mg/dL or higher    ¨ You are age 36to 76years of age, have risk factors for heart disease, and your LDL cholesterol is 70 mg/dL or higher    · Lifestyle changes  include changes to your diet, exercise, weight loss, and quitting smoking  It also includes decreasing the amount of alcohol you drink  · Supplements  include fish oil, red yeast rice, and garlic  Fish oil may help lower your triglyceride and LDL cholesterol levels  It may also increase your HDL cholesterol level  Red yeast rice may help decrease your total cholesterol level and LDL cholesterol level  Garlic may help lower your total cholesterol level  Do not take these supplements without talking to your healthcare provider     Nutrition to help lower your cholesterol levels:  A registered dietitian can help you create a healthy eating plan  Read food labels and choose foods low in saturated fat, trans fats, and cholesterol  · Decrease the total amount of fat you eat  Choose lean meats, fat-free or 1% fat milk, and low-fat dairy products, such as yogurt and cheese  Try to limit or avoid red meats  Limit or do not eat fried foods or baked goods such as cookies  · Replace unhealthy fats with healthy fats  Cook foods in olive oil or canola oil  Choose soft margarines that are low in saturated fat and trans fat  Seeds, nuts, and avocados are other examples of healthy fats  · Eat foods with omega-3 fats  Examples include salmon, tuna, mackerel, walnuts, and flaxseed  Eat fish 2 times per week  Children and pregnant women should not eat fish that have high levels of mercury, such as shark, swordfish, and joseph mackerel  · Increase the amount of plant-based foods you eat  Plant-based foods are low in cholesterol and fat  Eating more of these foods may help lower your cholesterol and help you lose weight  Examples of plant-based foods includes fruits, vegetables, legumes, and whole grains  Replace milk that contains dairy with almond, soy, or coconut milk  Eat beans and foods with soy for protein instead of meat  Ask your healthcare provider or dietitian for more information on plant-based foods  · Increase the amount of fiber you eat  High-fiber foods can help lower your LDL cholesterol  You should eat between 20 and 30 grams of fiber each day  Eat at least 5 servings of fruits and vegetables each day  Other examples of high-fiber foods include whole-grain or whole-wheat breads, pastas, or cereals, and brown rice  Eat 3 ounces of whole-grain foods each day  Increase fiber slowly  You may have abdominal discomfort, bloating, and gas if you add fiber to your diet too quickly  Lifestyle changes you can make to help lower your cholesterol levels:   · Maintain a healthy weight  Ask your healthcare provider how much you should weigh  Ask him or her to help you create a weight loss plan if you are overweight  Weight loss can decrease your total cholesterol and triglyceride levels  · Exercise regularly  Exercise can help lower your total cholesterol level and maintain a healthy weight  Exercise can also help increase your HDL cholesterol level  Work with your healthcare provider to create an exercise program that is right for you  Get at least 30 minutes of moderate exercise most days of the week  Examples of exercise include brisk walking, swimming, or biking  · Do not smoke  Nicotine and other chemicals in cigarettes and cigars can damage your lungs, heart, and blood vessels  They can also raise your triglyceride levels  Ask your healthcare provider for information if you currently smoke and need help to quit  E-cigarettes or smokeless tobacco still contain nicotine  Talk to your healthcare provider before you use these products  · Limit or do not drink alcohol  Alcohol can increase your triglyceride levels  Ask your healthcare provider if it is safe for you to drink alcohol  Also ask how much is safe for you to drink each day  © 2017 [facility] Information is for End User's use only and may not be sold, redistributed or otherwise used for commercial purposes  All illustrations and images included in CareNotes® are the copyrighted property of [employer]  or [employer]  The above information is an  only  It is not intended as medical advice for individual conditions or treatments  Talk to your doctor, nurse or pharmacist before following any medical regimen to see if it is safe and effective for you

## 2018-11-14 NOTE — PROGRESS NOTES
ADULT ANNUAL PHYSICAL  [facility] - [facility] INTERNAL MEDICINE    NAME: Dyan Rubin III  AGE: 43 y o  SEX: male  : 1980     DATE: 2018     Assessment and Plan:     Diagnoses and all orders for this visit:    Annual physical exam    Overweight (BMI 25 0-29  9)    Need for influenza vaccination  -     SECOND LINE ([facility]): influenza vaccine, 3756-0487, quadrivalent, 0 5 mL, preservative-free (AFLURIA, FLUARIX, FLULAVAL, FLUZONE)    Need for Tdap vaccination  -     TDAP VACCINE GREATER THAN OR EQUAL TO 8YO IM        Health maintenance and preventative care screenings were discussed with patient today  Appropriate education was printed on patient's after visit summary  · Discussed risks/benefits of screening for high cholesterol and diabetes  Patient is up-to-date with their preventive screenings  · Immunizations were reviewed: patient agrees to influenza vaccine and tetanus vaccine  Counseling:  Dental Health: discussed importance of regular tooth brushing, flossing, and dental visits  BMI Counseling: Body mass index is 28 5 kg/m²  Discussed with patient's BMI with him  The BMI is above average  BMI counseling and education was provided to the patient  Nutrition recommendations include consuming healthier snacks and decreasing soda and/or juice intake  Sexual health: discussed sexually transmitted diseases, partner selection, use of condoms, avoidance of unintended pregnancy, and contraceptive alternatives  · Alcohol/drug use: discussed moderation in alcohol intake and avoidance of illicit drug use  Return in about 6 months (around 2019) for Recheck  Chief Complaint:     Chief Complaint   Patient presents with    Establish Care      History of Present Illness:     Adult Annual Physical   Patient here for a comprehensive physical exam  The patient reports no problems  Reports he is going to [place] at the end of the month    He was seen by [facility] and given Typhoid vaccine but still needs influenza and tdap  He is on MTX due to pulmonary sarcoidosis followed by Rheum Dr Sutherland Race  He is also seen by Psych Dr Aurea Estrella for ADHD and is on Adderall  Diet and Physical Activity  · Diet/Nutrition: well balanced diet  · Weight concerns: patient is overweight (BMI 25 0-29 9)  · Exercise: no formal exercise  Depression Screening  PHQ-9 Depression Screening    PHQ-9:    Frequency of the following problems over the past two weeks:       Little interest or pleasure in doing things:  0 - not at all  Feeling down, depressed, or hopeless:  0 - not at all  PHQ-2 Score:  0       General Health  · Sleep: gets 7-8 hours of sleep on average  · Hearing: normal - bilateral   · Vision: most recent eye exam <1 year ago and wears contacts  · Dental: no dental visits for >1 year   Health  · History of STDs?: no   · Erectile dysfunction: no      Review of Systems:     Review of Systems   Constitutional: Negative  HENT: Negative  Respiratory: Positive for cough  Cardiovascular: Negative  Gastrointestinal: Negative  Genitourinary: Negative  Allergic/Immunologic: Positive for environmental allergies  Neurological: Negative  Psychiatric/Behavioral: Positive for decreased concentration  Negative for dysphoric mood and sleep disturbance  The patient is not nervous/anxious         Past Medical History:     Past Medical History:   Diagnosis Date    Concussion     Sarcoidosis       Past Surgical History:     Past Surgical History:   Procedure Laterality Date    BRONCHOSCOPY N/A 11/15/2016    Procedure: [place];  Surgeon: Samuel Fischer MD;  Location: [facility];  Service: ; DIAGNOSTIC; LAST ASSESSED: 12/13/16    EYE SURGERY      BIOPSY EYELID    FLEXIBLE BRONCHOSCOPY      (DIAGNOSTIC) RESOLVED: 11/15/16    WI BRONCHOSCOPY NEEDLE BX TRACHEA MAIN STEM&/BRON N/A 11/15/2016    Procedure: EBUS;  Surgeon: Samuel Fischer MD; Location: [facility];  Service: Thoracic; FOR PERIPH LESIONS; RESOLVED: 11/15/16      Social History:     Social History     Social History    Marital status: /Civil Union     Spouse name: N/A    Number of children: N/A    Years of education: N/A     Occupational History    WORKS AS A [employer] IN [employer]      Social History Main Topics    Smoking status: Never Smoker    Smokeless tobacco: Never Used    Alcohol use Yes      Comment: several beers per week    Drug use: No    Sexual activity: Yes     Partners: Female     Other Topics Concern    None     Social History Narrative        Occupation: maintenance      Family History:     Family History   Problem Relation Age of Onset    No Known Problems Mother     No Known Problems Father       Current Medications:     Current Outpatient Prescriptions   Medication Sig Dispense Refill    amphetamine-dextroamphetamine (ADDERALL XR) 30 MG 24 hr capsule       Ergocalciferol (VITAMIN D2 PO) Take by mouth once a week      folic acid (FOLVITE) 1 mg tablet Take 1 mg by mouth daily      methotrexate 10 MG tablet Take 10 mg by mouth once a week       No current facility-administered medications for this visit  Allergies:     No Known Allergies   Objective:     /80 (BP Location: Left arm, Patient Position: Sitting)   Pulse 100   Temp 98 1 °F (36 7 °C)   Resp 16   Ht 5' 10" (1 778 m)   Wt 90 1 kg (198 lb 9 6 oz)   SpO2 97%   BMI 28 50 kg/m²   Physical Exam     Health Maintenance:      The patient has no Health Maintenance topics of status Not Due  Health Maintenance Topics with due status: Overdue       Topic Date Due    Depression Screening PHQ 1980    Pneumococcal PPSV23 Medium Risk Adult 07/28/1998    DTaP,Tdap,and Td Vaccines 07/28/2000    INFLUENZA VACCINE 07/01/2018     Immunization History   Administered Date(s) Administered    Hep A, adult 09/18/2018    Influenza, injectable, quadrivalent, preservative free 0 5 mL 11/14/2018    Tdap 11/14/2018    Tetanus, adsorbed 05/01/2007    Typhoid, ViCPs 09/18/2018       Camryn Bansal DO  [facility] INTERNAL MEDICINE

## 2019-02-26 ENCOUNTER — TRANSCRIBE ORDERS (OUTPATIENT)
Dept: LAB | Facility: CLINIC | Age: 39
End: 2019-02-26

## 2019-02-26 ENCOUNTER — APPOINTMENT (OUTPATIENT)
Dept: LAB | Facility: CLINIC | Age: 39
End: 2019-02-26
Payer: COMMERCIAL

## 2019-02-26 DIAGNOSIS — D86.2 SARCOIDOSIS OF LUNG WITH SARCOIDOSIS OF LYMPH NODES (HCC): ICD-10-CM

## 2019-02-26 DIAGNOSIS — D86.2 SARCOIDOSIS OF LUNG WITH SARCOIDOSIS OF LYMPH NODES (HCC): Primary | ICD-10-CM

## 2019-02-26 PROCEDURE — 82164 ANGIOTENSIN I ENZYME TEST: CPT

## 2019-02-27 LAB — ACE SERPL-CCNC: 29 U/L (ref 14–82)

## 2019-07-18 ENCOUNTER — APPOINTMENT (OUTPATIENT)
Dept: LAB | Facility: CLINIC | Age: 39
End: 2019-07-18

## 2019-07-18 ENCOUNTER — TRANSCRIBE ORDERS (OUTPATIENT)
Dept: LAB | Facility: CLINIC | Age: 39
End: 2019-07-18

## 2019-07-18 DIAGNOSIS — Z00.8 HEALTH EXAMINATION IN POPULATION SURVEY: Primary | ICD-10-CM

## 2019-07-18 DIAGNOSIS — Z00.8 HEALTH EXAMINATION IN POPULATION SURVEY: ICD-10-CM

## 2019-07-18 LAB
CHOLEST SERPL-MCNC: 189 MG/DL (ref 50–200)
EST. AVERAGE GLUCOSE BLD GHB EST-MCNC: 108 MG/DL
HBA1C MFR BLD: 5.4 % (ref 4.2–6.3)
HDLC SERPL-MCNC: 89 MG/DL (ref 40–60)
LDLC SERPL CALC-MCNC: 73 MG/DL (ref 0–100)
NONHDLC SERPL-MCNC: 100 MG/DL
TRIGL SERPL-MCNC: 134 MG/DL

## 2019-07-18 PROCEDURE — 80061 LIPID PANEL: CPT

## 2019-07-18 PROCEDURE — 83036 HEMOGLOBIN GLYCOSYLATED A1C: CPT | Performed by: PREVENTIVE MEDICINE

## 2019-07-18 PROCEDURE — 36415 COLL VENOUS BLD VENIPUNCTURE: CPT | Performed by: PREVENTIVE MEDICINE

## 2020-07-30 ENCOUNTER — TELEPHONE (OUTPATIENT)
Dept: INTERNAL MEDICINE CLINIC | Facility: CLINIC | Age: 40
End: 2020-07-30

## 2020-07-30 ENCOUNTER — OFFICE VISIT (OUTPATIENT)
Dept: INTERNAL MEDICINE CLINIC | Facility: CLINIC | Age: 40
End: 2020-07-30
Payer: COMMERCIAL

## 2020-07-30 VITALS
BODY MASS INDEX: 27.61 KG/M2 | DIASTOLIC BLOOD PRESSURE: 82 MMHG | WEIGHT: 192.4 LBS | RESPIRATION RATE: 16 BRPM | SYSTOLIC BLOOD PRESSURE: 134 MMHG | HEART RATE: 89 BPM | TEMPERATURE: 97.8 F | OXYGEN SATURATION: 98 %

## 2020-07-30 DIAGNOSIS — L20.9 ATOPIC DERMATITIS, UNSPECIFIED TYPE: Primary | ICD-10-CM

## 2020-07-30 PROCEDURE — 99213 OFFICE O/P EST LOW 20 MIN: CPT | Performed by: NURSE PRACTITIONER

## 2020-07-30 PROCEDURE — 1036F TOBACCO NON-USER: CPT | Performed by: NURSE PRACTITIONER

## 2020-07-30 RX ORDER — DIAPER,BRIEF,INFANT-TODD,DISP
EACH MISCELLANEOUS 2 TIMES DAILY
Qty: 30 G | Refills: 0
Start: 2020-07-30 | End: 2022-04-05

## 2020-07-30 NOTE — ASSESSMENT & PLAN NOTE
Rash on the posterior scalp consistent in appearance with eczema, which pt does report a history of  Reassured that this does not demonstrate characteristics similar to tick borne illness  No systemic symptoms of lyme disease  Pt advised to treat the area with otc hydrocortisone bid and will f/u in 2 weeks  Call if sx worsen or new concerns arise

## 2020-07-30 NOTE — PROGRESS NOTES
Assessment/Plan:    Atopic dermatitis  Rash on the posterior scalp consistent in appearance with eczema, which pt does report a history of  Reassured that this does not demonstrate characteristics similar to tick borne illness  No systemic symptoms of lyme disease  Pt advised to treat the area with otc hydrocortisone bid and will f/u in 2 weeks  Call if sx worsen or new concerns arise  Diagnoses and all orders for this visit:    Atopic dermatitis, unspecified type          Subjective:      Patient ID: Yoan Chavez is a 38 y o  male  Pt is a 38 y o  y/o male who is seen today for evaluation of a rash  He first noticed the rash on the back of his head about 2 weeks ago, he thought it was eczema because it was itchy  He was not treating it with anything but his wife looked at it and was concerned that it may be a tick bite because it had a bullseye appearance  [place] and has seen ticks on him previously but none recently  He denies fever/chills, fatigue, joint/muscle pain, headaches  The following portions of the patient's history were reviewed and updated as appropriate: allergies, current medications, past family history, past medical history, past social history, past surgical history and problem list     Review of Systems   Constitutional: Negative for activity change, appetite change, chills, fatigue, fever and unexpected weight change  HENT: Negative for hearing loss  Eyes: Negative for visual disturbance  Respiratory: Negative for cough, chest tightness and shortness of breath  Cardiovascular: Negative for chest pain, palpitations and leg swelling  Gastrointestinal: Negative for abdominal pain, constipation, diarrhea, nausea and vomiting  Genitourinary: Negative for dysuria and frequency  Musculoskeletal: Negative for arthralgias and myalgias  Skin: Positive for rash  Neurological: Negative for dizziness and headaches  Psychiatric/Behavioral: Negative for sleep disturbance  The patient is not nervous/anxious  Objective:      /82   Pulse 89   Temp 97 8 °F (36 6 °C)   Resp 16   Wt 87 3 kg (192 lb 6 4 oz)   SpO2 98%   BMI 27 61 kg/m²          Physical Exam   Constitutional: He is oriented to person, place, and time  Vital signs are normal  He appears well-developed and well-nourished  He is cooperative  Cardiovascular: Normal rate, regular rhythm, normal heart sounds and normal pulses  No murmur heard  Pulmonary/Chest: Effort normal and breath sounds normal    Musculoskeletal: Normal range of motion  Lymphadenopathy:        Head (right side): No submental, no submandibular, no tonsillar, no preauricular, no posterior auricular and no occipital adenopathy present  Head (left side): No submental, no submandibular, no tonsillar, no preauricular, no posterior auricular and no occipital adenopathy present  He has no cervical adenopathy  He has no axillary adenopathy  Neurological: He is alert and oriented to person, place, and time  He has normal strength  Skin: Skin is warm, dry and intact  Rash noted  There is a 1 cm patch of flaky/dry skin on an erythematous base, consistent with eczema  Non-draining, non-tender  Psychiatric: He has a normal mood and affect  His speech is normal and behavior is normal  Judgment and thought content normal  Cognition and memory are normal    Vitals reviewed

## 2020-07-30 NOTE — TELEPHONE ENCOUNTER
LM for the patient to call back to schedule his 2 week follow up with Stevo Michaels and to also schedule his Yearly Physical with Dr Janet Elizalde at anytime

## 2021-06-12 ENCOUNTER — OFFICE VISIT (OUTPATIENT)
Dept: URGENT CARE | Facility: CLINIC | Age: 41
End: 2021-06-12
Payer: COMMERCIAL

## 2021-06-12 ENCOUNTER — APPOINTMENT (OUTPATIENT)
Dept: RADIOLOGY | Facility: CLINIC | Age: 41
End: 2021-06-12
Payer: COMMERCIAL

## 2021-06-12 VITALS
RESPIRATION RATE: 18 BRPM | DIASTOLIC BLOOD PRESSURE: 86 MMHG | WEIGHT: 200 LBS | HEIGHT: 69 IN | OXYGEN SATURATION: 96 % | HEART RATE: 133 BPM | TEMPERATURE: 101 F | BODY MASS INDEX: 29.62 KG/M2 | SYSTOLIC BLOOD PRESSURE: 154 MMHG

## 2021-06-12 DIAGNOSIS — J18.9 PNEUMONIA OF RIGHT LOWER LOBE DUE TO INFECTIOUS ORGANISM: Primary | ICD-10-CM

## 2021-06-12 DIAGNOSIS — R05.9 COUGHING: ICD-10-CM

## 2021-06-12 PROCEDURE — 99213 OFFICE O/P EST LOW 20 MIN: CPT | Performed by: FAMILY MEDICINE

## 2021-06-12 PROCEDURE — 71046 X-RAY EXAM CHEST 2 VIEWS: CPT

## 2021-06-12 RX ORDER — AZITHROMYCIN 250 MG/1
TABLET, FILM COATED ORAL
Qty: 6 TABLET | Refills: 0 | Status: SHIPPED | OUTPATIENT
Start: 2021-06-12 | End: 2021-06-16

## 2021-06-12 NOTE — PROGRESS NOTES
[facility]        NAME: Davonte Julio is a 38 y o  male  : 1980    MRN: [id number]  DATE: 2021  TIME: 2:53 PM    Assessment and Plan   Pneumonia of right lower lobe due to infectious organism [J18 9]  1  Pneumonia of right lower lobe due to infectious organism  XR chest pa & lateral    azithromycin (ZITHROMAX) 250 mg tablet     Right lower lobe pneumonia on x-ray  Azithromycin prescribed  Advised on drinking plenty of water to counteract dehydration  Patient Instructions     Follow up with PCP in 3-5 days  Proceed to  ER if symptoms worsen  Chief Complaint     Chief Complaint   Patient presents with    Cold Like Symptoms     sore throat, chills,  painful joints, sweats, dizzy, had sarcoidosis 2018   has discoloration of both arms and hands skin blanching          History of Present Illness       41-year-old male with past history of sarcoidosis presents today with 1 5 weeks tactile fevers, diaphoresis, coughing, arthralgias, dizziness and headaches  Believes that it has a flare-up of his sarcoidosis  Denies any obvious sick contacts  Otherwise, he denies any dyspnea, wheezing, abdominal pain or nausea  Has only been taking Tylenol and Advil with minimal relief  Review of Systems   Review of Systems   Constitutional: Positive for appetite change, diaphoresis, fatigue and fever  HENT: Positive for rhinorrhea  Respiratory: Positive for cough  Negative for shortness of breath and wheezing  Cardiovascular: Positive for chest pain (upper central)  Gastrointestinal: Negative for abdominal pain and nausea  Musculoskeletal: Positive for arthralgias  Neurological: Positive for dizziness and headaches           Current Medications       Current Outpatient Medications:     amphetamine-dextroamphetamine (ADDERALL XR) 30 MG 24 hr capsule, , Disp: , Rfl:     azithromycin (ZITHROMAX) 250 mg tablet, Take 2 tablets today then 1 tablet daily x 4 days, Disp: 6 tablet, Rfl: 0    Ergocalciferol (VITAMIN D2 PO), Take by mouth once a week, Disp: , Rfl:     folic acid (FOLVITE) 1 mg tablet, Take 1 mg by mouth daily, Disp: , Rfl:     hydrocortisone 1 % cream, Apply topically 2 (two) times a day (Patient not taking: Reported on 6/12/2021), Disp: 30 g, Rfl: 0    methotrexate 10 MG tablet, Take 10 mg by mouth once a week, Disp: , Rfl:     Current Allergies     Allergies as of 06/12/2021    (No Known Allergies)            The following portions of the patient's history were reviewed and updated as appropriate: allergies, current medications, past family history, past medical history, past social history, past surgical history and problem list      Past Medical History:   Diagnosis Date    Concussion     Sarcoidosis        Past Surgical History:   Procedure Laterality Date    BRONCHOSCOPY N/A 11/15/2016    Procedure: Whitney Lara;  Surgeon: Ellen Summers MD;  [facility]: [facility];  Service: ; DIAGNOSTIC; LAST ASSESSED: 12/13/16    EYE SURGERY      BIOPSY EYELID    FLEXIBLE BRONCHOSCOPY      (DIAGNOSTIC) RESOLVED: 11/15/16    NY BRONCHOSCOPY NEEDLE BX TRACHEA MAIN STEM&/BRON N/A 11/15/2016    Procedure: EBUS;  Surgeon: Ellen uSmmers MD;  Location: [facility];  Service: Thoracic; FOR PERIPH LESIONS; RESOLVED: 11/15/16       Family History   Problem Relation Age of Onset    No Known Problems Mother     No Known Problems Father          Medications have been verified  Objective   /86   Pulse (!) 133   Temp (!) 101 °F (38 3 °C)   Resp 18   Ht 5' 9" (1 753 m)   Wt 90 7 kg (200 lb)   SpO2 96%   BMI 29 53 kg/m²   No LMP for male patient  Physical Exam     Physical Exam  Vitals signs and nursing note reviewed  Constitutional:       General: He is in acute distress  Appearance: Normal appearance  He is not ill-appearing, toxic-appearing or diaphoretic  HENT:      Head: Normocephalic and atraumatic  Nose: Rhinorrhea present        Comments: Inflamed nasal mucosa with recent bleeds     Mouth/Throat:      Mouth: Mucous membranes are moist       Pharynx: No posterior oropharyngeal erythema  Comments: Small white ulceration on soft palate  Eyes:      General:         Right eye: No discharge  Left eye: No discharge  Conjunctiva/sclera: Conjunctivae normal    Cardiovascular:      Rate and Rhythm: Regular rhythm  Tachycardia present  Pulmonary:      Effort: Pulmonary effort is normal  No respiratory distress  Breath sounds: Normal breath sounds  No wheezing, rhonchi or rales  Skin:     General: Skin is warm  Findings: No erythema  Neurological:      General: No focal deficit present  Mental Status: He is alert and oriented to person, place, and time  Psychiatric:         Mood and Affect: Mood normal          Behavior: Behavior normal          Thought Content:  Thought content normal          Judgment: Judgment normal

## 2021-06-12 NOTE — LETTER
June 12, 2021     Patient: Glenda Vines III   YOB: 1980   Date of Visit: 6/12/2021       To Whom It May Concern:    Rubens Good was seen in my office on 06/12/2021  Please excuse his absence  May return to work on 06/15/2021  If you have any questions or concerns, please don't hesitate to call           Sincerely,        Snow Weems MD    CC: No Recipients

## 2021-06-14 ENCOUNTER — TELEPHONE (OUTPATIENT)
Dept: INTERNAL MEDICINE CLINIC | Facility: CLINIC | Age: 41
End: 2021-06-14

## 2021-06-14 NOTE — TELEPHONE ENCOUNTER
----- Message from Faiza Marte DO sent at 6/14/2021  1:25 PM EDT -----  Regarding: schedule urgent care follow up  Can you please schedule for urgent care follow up  Will need repeat imaging  Follow up this week or after my vacation    Thanks   ----- Message -----  From: Interface, Radiology Results In  Sent: 6/13/2021  12:59 PM EDT  To: Faiza Marte DO

## 2021-06-14 NOTE — TELEPHONE ENCOUNTER
Called and left patient a detailed voice message to call the office to schedule an appointment this week or week after next with Dr Camelia Lucero

## 2021-06-15 ENCOUNTER — OFFICE VISIT (OUTPATIENT)
Dept: INTERNAL MEDICINE CLINIC | Facility: CLINIC | Age: 41
End: 2021-06-15
Payer: COMMERCIAL

## 2021-06-15 VITALS
WEIGHT: 194 LBS | SYSTOLIC BLOOD PRESSURE: 108 MMHG | DIASTOLIC BLOOD PRESSURE: 80 MMHG | BODY MASS INDEX: 28.73 KG/M2 | HEART RATE: 89 BPM | TEMPERATURE: 97.9 F | OXYGEN SATURATION: 96 % | HEIGHT: 69 IN | RESPIRATION RATE: 16 BRPM

## 2021-06-15 DIAGNOSIS — J18.9 PNEUMONIA OF RIGHT LOWER LOBE DUE TO INFECTIOUS ORGANISM: Primary | ICD-10-CM

## 2021-06-15 DIAGNOSIS — D86.9 SARCOIDOSIS: ICD-10-CM

## 2021-06-15 PROCEDURE — 99214 OFFICE O/P EST MOD 30 MIN: CPT | Performed by: INTERNAL MEDICINE

## 2021-06-15 RX ORDER — PROMETHAZINE HYDROCHLORIDE AND CODEINE PHOSPHATE 6.25; 1 MG/5ML; MG/5ML
5 SYRUP ORAL EVERY 4 HOURS PRN
Qty: 180 ML | Refills: 0 | Status: SHIPPED | OUTPATIENT
Start: 2021-06-15 | End: 2022-04-05

## 2021-06-15 RX ORDER — CEFDINIR 300 MG/1
300 CAPSULE ORAL EVERY 12 HOURS SCHEDULED
Qty: 14 CAPSULE | Refills: 0 | Status: SHIPPED | OUTPATIENT
Start: 2021-06-15 | End: 2021-06-22

## 2021-06-15 NOTE — LETTER
Digna 15, 2021     Patient: Dona Curtis III   YOB: 1980   Date of Visit: 6/15/2021       To Whom it May Concern:    Adelaida Merida is under my professional care  He was seen in my office on 6/15/2021  He may return to work on 6/21/21  If you have any questions or concerns, please don't hesitate to call           Sincerely,          Jen Barfield, [address] Internal Medicine      CC: No Recipients

## 2021-06-15 NOTE — PROGRESS NOTES
Assessment/Plan:    Problem List Items Addressed This Visit        Respiratory    Pneumonia of right lower lobe due to infectious organism - Primary     -on zpak day 4/5  Will add cefdinir 300mg q12 x 7 for CAP  Risks/benefits/side effects discussed   -add promethazine with codeine for cough  PDMP queried  -will need repeat CXR in 6 weeks to check for resolution  -work note provided  Relevant Medications    cefdinir (OMNICEF) 300 mg capsule    promethazine-codeine (PHENERGAN WITH CODEINE) 6 25-10 mg/5 mL syrup    Other Relevant Orders    XR chest pa & lateral       Other    Sarcoidosis     -previously on MTX as per Rheum Dr Pinkie Lesch  -requests referral to new Rheum at [facility]         Relevant Orders    Ambulatory referral to Rheumatology          Subjective:      Patient ID: Ruth Bradley is a 38 y o  male  HPI  40yo male with h/o sarcoidosis here for Urgent Care follow up  He is accompanied by his wife  He is fully vaccinated  He had sore throat and felt fatigued  The next day he had myalgias, chills, back pain  HA developed, felt bad for 2-3 days  He then presented to [facility] 6/12  He had cough and fever and found to have RLL PNA  He has been started on zpak  Because of coughing he threw his low back out from coughing  He has been taking advil and muscle rubs  His wife gave him tylenol with codeine  Overall better from onset of symptoms but continues to have diaphoresis         The following portions of the patient's history were reviewed and updated as appropriate: allergies, current medications, past family history, past medical history, past social history, past surgical history and problem list       Current Outpatient Medications:     amphetamine-dextroamphetamine (ADDERALL XR) 30 MG 24 hr capsule, Take 30 mg by mouth Take 1 tab 3 times a day, Disp: , Rfl:     azithromycin (ZITHROMAX) 250 mg tablet, Take 2 tablets today then 1 tablet daily x 4 days, Disp: 6 tablet, Rfl: 0    cefdinir (OMNICEF) 300 mg capsule, Take 1 capsule (300 mg total) by mouth every 12 (twelve) hours for 7 days, Disp: 14 capsule, Rfl: 0    Ergocalciferol (VITAMIN D2 PO), Take by mouth once a week, Disp: , Rfl:     folic acid (FOLVITE) 1 mg tablet, Take 1 mg by mouth daily, Disp: , Rfl:     hydrocortisone 1 % cream, Apply topically 2 (two) times a day (Patient not taking: Reported on 6/12/2021), Disp: 30 g, Rfl: 0    methotrexate 10 MG tablet, Take 10 mg by mouth once a week, Disp: , Rfl:     promethazine-codeine (PHENERGAN WITH CODEINE) 6 25-10 mg/5 mL syrup, Take 5 mL by mouth every 4 (four) hours as needed for cough, Disp: 180 mL, Rfl: 0    Review of Systems   Constitutional: Positive for activity change, appetite change, diaphoresis and fatigue  HENT: Positive for postnasal drip and rhinorrhea  Negative for congestion, sneezing and sore throat  Anosmia   Respiratory: Positive for cough and shortness of breath  Negative for chest tightness and wheezing  Cardiovascular: Negative for palpitations  Gastrointestinal: Negative for abdominal pain, constipation, diarrhea, nausea and vomiting  Genitourinary: Negative for difficulty urinating  Musculoskeletal: Positive for back pain  Neurological: Positive for headaches  Negative for dizziness  Objective:    /80 (BP Location: Left arm, Patient Position: Sitting)   Pulse 89   Temp 97 9 °F (36 6 °C) (Skin)   Resp 16   Ht 5' 9" (1 753 m)   Wt 88 kg (194 lb)   SpO2 96%   BMI 28 65 kg/m²      Physical Exam  Vitals reviewed  Constitutional:       General: He is not in acute distress  Appearance: Normal appearance  He is diaphoretic  HENT:      Head: Normocephalic  Right Ear: There is impacted cerumen  Left Ear: There is impacted cerumen  Nose: Nose normal  No congestion or rhinorrhea  Mouth/Throat:      Mouth: Mucous membranes are moist       Pharynx: Oropharynx is clear   No oropharyngeal exudate or posterior oropharyngeal erythema  Cardiovascular:      Rate and Rhythm: Normal rate and regular rhythm  Pulses: Normal pulses  Heart sounds: Normal heart sounds  Pulmonary:      Effort: Pulmonary effort is normal  No respiratory distress  Breath sounds: No stridor  Rhonchi present  No wheezing  Comments: Moist cough  Musculoskeletal:      Comments: No spinous process tenderness on palpation   Neurological:      Mental Status: He is alert and oriented to person, place, and time  Psychiatric:         Attention and Perception: Attention normal          Mood and Affect: Mood and affect normal          Speech: Speech normal          Behavior: Behavior is cooperative

## 2021-06-15 NOTE — ASSESSMENT & PLAN NOTE
-on zpak day 4/5  Will add cefdinir 300mg q12 x 7 for CAP  Risks/benefits/side effects discussed   -add promethazine with codeine for cough  PDMP queried  -will need repeat CXR in 6 weeks to check for resolution  -work note provided

## 2021-06-15 NOTE — ASSESSMENT & PLAN NOTE
-previously on MTX as per Rheum Dr Halle Ruiz  -requests referral to [facility] at [place] [id number]

## 2021-08-11 ENCOUNTER — APPOINTMENT (OUTPATIENT)
Dept: LAB | Facility: CLINIC | Age: 41
End: 2021-08-11

## 2021-08-11 DIAGNOSIS — Z00.8 HEALTH EXAMINATION IN POPULATION SURVEY: ICD-10-CM

## 2021-08-11 LAB
CHOLEST SERPL-MCNC: 241 MG/DL (ref 50–200)
HDLC SERPL-MCNC: 96 MG/DL
LDLC SERPL CALC-MCNC: 121 MG/DL (ref 0–100)
NONHDLC SERPL-MCNC: 145 MG/DL
TRIGL SERPL-MCNC: 122 MG/DL

## 2021-08-11 PROCEDURE — 80061 LIPID PANEL: CPT

## 2021-08-11 PROCEDURE — 83036 HEMOGLOBIN GLYCOSYLATED A1C: CPT

## 2021-08-11 PROCEDURE — 36415 COLL VENOUS BLD VENIPUNCTURE: CPT

## 2021-08-12 LAB
EST. AVERAGE GLUCOSE BLD GHB EST-MCNC: 103 MG/DL
HBA1C MFR BLD: 5.2 %

## 2021-08-13 ENCOUNTER — HOSPITAL ENCOUNTER (OUTPATIENT)
Dept: RADIOLOGY | Facility: HOSPITAL | Age: 41
Discharge: HOME | End: 2021-08-13
Payer: COMMERCIAL

## 2021-08-13 DIAGNOSIS — J18.9 PNEUMONIA OF RIGHT LOWER LOBE DUE TO INFECTIOUS ORGANISM: ICD-10-CM

## 2021-08-13 PROCEDURE — 71046 X-RAY EXAM CHEST 2 VIEWS: CPT

## 2021-08-24 ENCOUNTER — OFFICE VISIT (OUTPATIENT)
Dept: INTERNAL MEDICINE CLINIC | Facility: CLINIC | Age: 41
End: 2021-08-24
Payer: COMMERCIAL

## 2021-08-24 VITALS
DIASTOLIC BLOOD PRESSURE: 90 MMHG | HEART RATE: 108 BPM | RESPIRATION RATE: 16 BRPM | WEIGHT: 191.2 LBS | SYSTOLIC BLOOD PRESSURE: 158 MMHG | BODY MASS INDEX: 28.32 KG/M2 | OXYGEN SATURATION: 99 % | HEIGHT: 69 IN | TEMPERATURE: 97.1 F

## 2021-08-24 DIAGNOSIS — I10 BENIGN ESSENTIAL HYPERTENSION: ICD-10-CM

## 2021-08-24 DIAGNOSIS — F90.9 ATTENTION DEFICIT HYPERACTIVITY DISORDER (ADHD), UNSPECIFIED ADHD TYPE: ICD-10-CM

## 2021-08-24 DIAGNOSIS — Z00.00 ANNUAL PHYSICAL EXAM: Primary | ICD-10-CM

## 2021-08-24 PROBLEM — J18.9 PNEUMONIA OF RIGHT LOWER LOBE DUE TO INFECTIOUS ORGANISM: Status: RESOLVED | Noted: 2021-06-15 | Resolved: 2021-08-24

## 2021-08-24 PROCEDURE — 99396 PREV VISIT EST AGE 40-64: CPT | Performed by: INTERNAL MEDICINE

## 2021-08-24 RX ORDER — METHYLPHENIDATE HYDROCHLORIDE 80 MG/1
CAPSULE ORAL
COMMUNITY
Start: 2021-08-13 | End: 2022-04-05

## 2021-08-24 NOTE — PROGRESS NOTES
ADULT ANNUAL [address] [facility] INTERNAL MEDICINE    NAME: Yusef Dao III  AGE: 42 y o  SEX: male  : 1980     DATE: 2021     Assessment and Plan:     Problem List Items Addressed This Visit        Cardiovascular and Mediastinum    Benign essential hypertension     -new diagnosis  -discussed lifestyle modifications to include weight loss and adherence to low sodium diet  -obtain labs  -start monitoring home bp with log for review in 4 months         Relevant Orders    CBC    UA (URINE) with reflex to Scope    TSH, 3rd generation with Free T4 reflex    Comprehensive metabolic panel       Other    ADHD (attention deficit hyperactivity disorder)    Relevant Medications    Jornay PM 80 MG CP24    Other Relevant Orders    CBC    Comprehensive metabolic panel      Other Visit Diagnoses     Annual physical exam    -  Primary          Immunizations and preventive care screenings were discussed with patient today  Appropriate education was printed on patient's after visit summary  Counseling:  Alcohol/drug use: discussed moderation in alcohol intake, the recommendations for healthy alcohol use, and avoidance of illicit drug use  Dental Health: discussed importance of regular tooth brushing, flossing, and dental visits  · Exercise: the importance of regular exercise/physical activity was discussed  Recommend exercise 3-5 times per week for at least 30 minutes  · Immunizations discussed  He is fully vaccinated with COVID  Recommend yearly influenza vaccine  · Cancer screenings discussed  Advised self testicular exams  BMI Counseling: Body mass index is 28 24 kg/m²  The BMI is above normal  Nutrition recommendations include decreasing portion sizes, encouraging healthy choices of fruits and vegetables, consuming healthier snacks, limiting drinks that contain sugar, moderation in carbohydrate intake and reducing intake of cholesterol   Exercise recommendations include moderate physical activity 150 minutes/week, exercising 3-5 times per week and strength training exercises  No pharmacotherapy was ordered  Return in about 4 months (around 12/24/2021) for Recheck  Chief Complaint:     Chief Complaint   Patient presents with    Physical Exam      History of Present Illness:     Adult Annual Physical   Patient with ADHD and h/o sarcoidosis  here for a comprehensive physical exam      Diet and Physical Activity  · Diet/Nutrition: well balanced diet  · Exercise: stays active at work  Takes his puppy for a bike ride         Depression Screening  PHQ-9 Depression Screening    PHQ-9:   Frequency of the following problems over the past two weeks:      Little interest or pleasure in doing things: 0 - not at all  Feeling down, depressed, or hopeless: 0 - not at all  PHQ-2 Score: 0       General Health  · Sleep: sleeps well  · Hearing: normal - none   · Vision: most recent eye exam <1 year ago and wears contacts  · Dental: no dental visits for >1 year   Health  · Symptoms include: erectile dysfunction  Difficulty obtaining an erection  Review of Systems:     Review of Systems   Constitutional: Positive for unexpected weight change (gradual weight loss)  Negative for activity change and appetite change  HENT: Negative for congestion, rhinorrhea and sneezing  Respiratory: Negative for cough, chest tightness, shortness of breath and wheezing  Cardiovascular: Negative for chest pain, palpitations and leg swelling  Gastrointestinal: Negative for abdominal pain, constipation, diarrhea, nausea and vomiting  Genitourinary: Negative for decreased urine volume and difficulty urinating  Musculoskeletal: Negative for arthralgias, back pain and myalgias  Neurological: Negative for dizziness and headaches  Psychiatric/Behavioral: Positive for decreased concentration  Negative for dysphoric mood and sleep disturbance   The patient is Social Connections:     Frequency of Communication with Friends and Family:     Frequency of Social Gatherings with Friends and Family:     Attends Yarsani Services:     Active Member of Clubs or Organizations:     Attends Club or Organization Meetings:     Marital Status:    Intimate Partner Violence:     Fear of Current or Ex-Partner:     Emotionally Abused:     Physically Abused:     Sexually Abused:       Current Medications:     Current Outpatient Medications   Medication Sig Dispense Refill    Jornakarishma PM 80 MG CP24       Ergocalciferol (VITAMIN D2 PO) Take by mouth once a week (Patient not taking: Reported on 2/89/9223)      folic acid (FOLVITE) 1 mg tablet Take 1 mg by mouth daily (Patient not taking: Reported on 8/24/2021)      hydrocortisone 1 % cream Apply topically 2 (two) times a day (Patient not taking: Reported on 6/12/2021) 30 g 0    methotrexate 10 MG tablet Take 10 mg by mouth once a week (Patient not taking: Reported on 8/24/2021)      promethazine-codeine (PHENERGAN WITH CODEINE) 6 25-10 mg/5 mL syrup Take 5 mL by mouth every 4 (four) hours as needed for cough (Patient not taking: Reported on 8/24/2021) 180 mL 0     No current facility-administered medications for this visit  Allergies:     No Known Allergies   Physical Exam:     /90 (BP Location: Left arm, Patient Position: Sitting)   Pulse (!) 108   Temp (!) 97 1 °F (36 2 °C)   Resp 16   Ht 5' 9" (1 753 m)   Wt 86 7 kg (191 lb 3 2 oz)   SpO2 99%   BMI 28 24 kg/m²     Physical Exam  Vitals reviewed  Constitutional:       Appearance: He is overweight  HENT:      Head: Normocephalic  Right Ear: There is impacted cerumen  Left Ear: There is impacted cerumen  Nose: Nose normal  No congestion or rhinorrhea  Mouth/Throat:      Mouth: Mucous membranes are moist       Pharynx: Oropharynx is clear  No oropharyngeal exudate  Cardiovascular:      Rate and Rhythm: Regular rhythm   Tachycardia present  Pulses: Normal pulses  Heart sounds: Normal heart sounds  Pulmonary:      Effort: Pulmonary effort is normal  No respiratory distress  Breath sounds: Normal breath sounds  No wheezing  Abdominal:      General: Bowel sounds are normal  There is no distension  Palpations: Abdomen is soft  Tenderness: There is no abdominal tenderness  Musculoskeletal:      Cervical back: Neck supple  No tenderness  Right lower leg: No edema  Left lower leg: No edema  Lymphadenopathy:      Cervical: No cervical adenopathy  Skin:     Coloration: Skin is not jaundiced or pale  Comments: Tattoo on left ankle   Neurological:      Mental Status: He is alert and oriented to person, place, and time  Psychiatric:         Attention and Perception: Attention normal          Mood and Affect: Mood normal          Speech: Speech normal          Behavior: Behavior normal           Recent Results (from the past 336 hour(s))   Hemoglobin A1C    Collection Time: 08/11/21  5:33 PM   Result Value Ref Range    Hemoglobin A1C 5 2 Normal 3 8-5 6%; PreDiabetic 5 7-6 4%;  Diabetic >=6 5%; Glycemic control for adults with diabetes <7 0% %     mg/dl   Lipid panel    Collection Time: 08/11/21  5:33 PM   Result Value Ref Range    Cholesterol 241 (H) 50 - 200 mg/dL    Triglycerides 122 <=150 mg/dL    HDL, Direct 96 >=40 mg/dL    LDL Calculated 121 (H) 0 - 100 mg/dL    Non-HDL-Chol (CHOL-HDL) 145 mg/dl       J Luis Birmingham DO  [facility] INTERNAL MEDICINE

## 2021-08-24 NOTE — ASSESSMENT & PLAN NOTE
-new diagnosis  -discussed lifestyle modifications to include weight loss and adherence to low sodium diet  -obtain labs  -start monitoring home bp with log for review in 4 months

## 2021-08-24 NOTE — PATIENT INSTRUCTIONS
Wellness Visit for Adults   AMBULATORY CARE:   A wellness visit  is when you see your healthcare provider to get screened for health problems  Your healthcare provider will also give you advice on how to stay healthy  Write down your questions so you remember to ask them  Ask your healthcare provider how often you should have a wellness visit  What happens at a wellness visit:  Your healthcare provider will ask about your health, and your family history of health problems  This includes high blood pressure, heart disease, and cancer  He or she will ask if you have symptoms that concern you, if you smoke, and about your mood  You may also be asked about your intake of medicines, supplements, food, and alcohol  Any of the following may be done:  · Your weight  will be checked  Your height may also be checked so your body mass index (BMI) can be calculated  Your BMI shows if you are at a healthy weight  · Your blood pressure  and heart rate will be checked  Your temperature may also be checked  · Blood and urine tests  may be done  Blood tests may be done to check your cholesterol levels  Abnormal cholesterol levels increase your risk for heart disease and stroke  You may also need a blood or urine test to check for diabetes if you are at increased risk  Urine tests may be done to look for signs of an infection or kidney disease  · A physical exam  includes checking your heartbeat and lungs with a stethoscope  Your healthcare provider may also check your skin to look for sun damage  · Screening tests  may be recommended  A screening test is done to check for diseases that may not cause symptoms  The screening tests you may need depend on your age, gender, family history, and lifestyle habits  For example, colorectal screening may be recommended if you are 48years old or older  Screening tests you need if you are a woman:   · A Pap smear  is used to screen for cervical cancer   Pap smears are usually done every 3 to 5 years depending on your age  You may need them more often if you have had abnormal Pap smear test results in the past  Ask your healthcare provider how often you should have a Pap smear  · A mammogram  is an x-ray of your breasts to screen for breast cancer  Experts recommend mammograms every 2 years starting at age 48 years  You may need a mammogram at age 52 years or younger if you have an increased risk for breast cancer  Talk to your healthcare provider about when you should start having mammograms and how often you need them  Vaccines you may need:   · Get an influenza vaccine  every year  The influenza vaccine protects you from the flu  Several types of viruses cause the flu  The viruses change over time, so new vaccines are made each year  · Get a tetanus-diphtheria (Td) booster vaccine  every 10 years  This vaccine protects you against tetanus and diphtheria  Tetanus is a severe infection that may cause painful muscle spasms and lockjaw  Diphtheria is a severe bacterial infection that causes a thick covering in the back of your mouth and throat  · Get a human papillomavirus (HPV) vaccine  if you are female and aged 23 to 32 or male 23 to 24 and never received it  This vaccine protects you from HPV infection  HPV is the most common infection spread by sexual contact  HPV may also cause vaginal, penile, and anal cancers  · Get a pneumococcal vaccine  if you are aged 72 years or older  The pneumococcal vaccine is an injection given to protect you from pneumococcal disease  Pneumococcal disease is an infection caused by pneumococcal bacteria  The infection may cause pneumonia, meningitis, or an ear infection  · Get a shingles vaccine  if you are 60 or older, even if you have had shingles before  The shingles vaccine is an injection to protect you from the varicella-zoster virus  This is the same virus that causes chickenpox   Shingles is a painful rash that develops in people who had chickenpox or have been exposed to the virus  How to eat healthy:  My Plate is a model for planning healthy meals  It shows the types and amounts of foods that should go on your plate  Fruits and vegetables make up about half of your plate, and grains and protein make up the other half  A serving of dairy is included on the side of your plate  The amount of calories and serving sizes you need depends on your age, gender, weight, and height  Examples of healthy foods are listed below:  · Eat a variety of vegetables  such as dark green, red, and orange vegetables  You can also include canned vegetables low in sodium (salt) and frozen vegetables without added butter or sauces  · Eat a variety of fresh fruits , canned fruit in 100% juice, frozen fruit, and dried fruit  · Include whole grains  At least half of the grains you eat should be whole grains  Examples include whole-wheat bread, wheat pasta, brown rice, and whole-grain cereals such as oatmeal     · Eat a variety of protein foods such as seafood (fish and shellfish), lean meat, and poultry without skin (turkey and chicken)  Examples of lean meats include pork leg, shoulder, or tenderloin, and beef round, sirloin, tenderloin, and extra lean ground beef  Other protein foods include eggs and egg substitutes, beans, peas, soy products, nuts, and seeds  · Choose low-fat dairy products such as skim or 1% milk or low-fat yogurt, cheese, and cottage cheese  · Limit unhealthy fats  such as butter, hard margarine, and shortening  Exercise:  Exercise at least 30 minutes per day on most days of the week  Some examples of exercise include walking, biking, dancing, and swimming  You can also fit in more physical activity by taking the stairs instead of the elevator or parking farther away from stores  Include muscle strengthening activities 2 days each week  Regular exercise provides many health benefits   It helps you manage your weight, and decreases your risk for type 2 diabetes, heart disease, stroke, and high blood pressure  Exercise can also help improve your mood  Ask your healthcare provider about the best exercise plan for you  General health and safety guidelines:   · Do not smoke  Nicotine and other chemicals in cigarettes and cigars can cause lung damage  Ask your healthcare provider for information if you currently smoke and need help to quit  E-cigarettes or smokeless tobacco still contain nicotine  Talk to your healthcare provider before you use these products  · Limit alcohol  A drink of alcohol is 12 ounces of beer, 5 ounces of wine, or 1½ ounces of liquor  · Lose weight, if needed  Being overweight increases your risk of certain health conditions  These include heart disease, high blood pressure, type 2 diabetes, and certain types of cancer  · Protect your skin  Do not sunbathe or use tanning beds  Use sunscreen with a SPF 15 or higher  Apply sunscreen at least 15 minutes before you go outside  Reapply sunscreen every 2 hours  Wear protective clothing, hats, and sunglasses when you are outside  · Drive safely  Always wear your seatbelt  Make sure everyone in your car wears a seatbelt  A seatbelt can save your life if you are in an accident  Do not use your cell phone when you are driving  This could distract you and cause an accident  Pull over if you need to make a call or send a text message  · Practice safe sex  Use latex condoms if are sexually active and have more than one partner  Your healthcare provider may recommend screening tests for sexually transmitted infections (STIs)  · Wear helmets, lifejackets, and protective gear  Always wear a helmet when you ride a bike or motorcycle, go skiing, or play sports that could cause a head injury  Wear protective equipment when you play sports  Wear a lifejacket when you are on a boat or doing water sports      © Copyright [employer] 2021 Information is for End User's use only and may not be sold, redistributed or otherwise used for commercial purposes  All illustrations and images included in CareNotes® are the copyrighted property of [employer]  or [employer]  The above information is an  only  It is not intended as medical advice for individual conditions or treatments  Talk to your doctor, nurse or pharmacist before following any medical regimen to see if it is safe and effective for you  Weight Management   AMBULATORY CARE:   Why it is important to manage your weight:  Being overweight increases your risk of health conditions such as heart disease, high blood pressure, type 2 diabetes, and certain types of cancer  It can also increase your risk for osteoarthritis, sleep apnea, and other respiratory problems  Aim for a slow, steady weight loss  Even a small amount of weight loss can lower your risk of health problems  How to lose weight safely:  A safe and healthy way to lose weight is to eat fewer calories and get regular exercise  · You can lose up about 1 pound a week by decreasing the number of calories you eat by 500 calories each day  You can decrease calories by eating smaller portion sizes or by cutting out high-calorie foods  Read labels to find out how many calories are in the foods you eat  · You can also burn calories with exercise such as walking, swimming, or biking  You will be more likely to keep weight off if you make these changes part of your lifestyle  Exercise at least 30 minutes per day on most days of the week  You can also fit in more physical activity by taking the stairs instead of the elevator or parking farther away from stores  Ask your healthcare provider about the best exercise plan for you  Healthy meal plan for weight management:  A healthy meal plan includes a variety of foods, contains fewer calories, and helps you stay healthy   A healthy meal plan includes the following:     · Eat whole-grain foods more often  A healthy meal plan should contain fiber  Fiber is the part of grains, fruits, and vegetables that is not broken down by your body  Whole-grain foods are healthy and provide extra fiber in your diet  Some examples of whole-grain foods are whole-wheat breads and pastas, oatmeal, brown rice, and bulgur  · Eat a variety of vegetables every day  Include dark, leafy greens such as spinach, kale, juliet greens, and mustard greens  Eat yellow and orange vegetables such as carrots, sweet potatoes, and winter squash  · Eat a variety of fruits every day  Choose fresh or canned fruit (canned in its own juice or light syrup) instead of juice  Fruit juice has very little or no fiber  · Eat low-fat dairy foods  Drink fat-free (skim) milk or 1% milk  Eat fat-free yogurt and low-fat cottage cheese  Try low-fat cheeses such as mozzarella and other reduced-fat cheeses  · Choose meat and other protein foods that are low in fat  Choose beans or other legumes such as split peas or lentils  Choose fish, skinless poultry (chicken or turkey), or lean cuts of red meat (beef or pork)  Before you cook meat or poultry, cut off any visible fat  · Use less fat and oil  Try baking foods instead of frying them  Add less fat, such as margarine, sour cream, regular salad dressing and mayonnaise to foods  Eat fewer high-fat foods  Some examples of high-fat foods include french fries, doughnuts, ice cream, and cakes  · Eat fewer sweets  Limit foods and drinks that are high in sugar  This includes candy, cookies, regular soda, and sweetened drinks  Ways to decrease calories:   · Eat smaller portions  ? Use a small plate with smaller servings  ? Do not eat second helpings  ? When you eat at a restaurant, ask for a box and place half of your meal in the box before you eat  ? Share an entrée with someone else  · Replace high-calorie snacks with healthy, low-calorie snacks  ? Choose fresh fruit, vegetables, fat-free rice cakes, or air-popped popcorn instead of potato chips, nuts, or chocolate  ? Choose water or calorie-free drinks instead of soda or sweetened drinks  · Do not shop for groceries when you are hungry  You may be more likely to make unhealthy food choices  Take a grocery list of healthy foods and shop after you have eaten  · Eat regular meals  Do not skip meals  Skipping meals can lead to overeating later in the day  This can make it harder for you to lose weight  Eat a healthy snack in place of a meal if you do not have time to eat a regular meal  Talk with a dietitian to help you create a meal plan and schedule that is right for you  Other things to consider as you try to lose weight:   · Be aware of situations that may give you the urge to overeat, such as eating while watching television  Find ways to avoid these situations  For example, read a book, go for a walk, or do crafts  · Meet with a weight loss support group or friends who are also trying to lose weight  This may help you stay motivated to continue working on your weight loss goals  © Copyright [employer] 2021 Information is for End User's use only and may not be sold, redistributed or otherwise used for commercial purposes  All illustrations and images included in CareNotes® are the copyrighted property of [employer] Inc  or [employer]  The above information is an  only  It is not intended as medical advice for individual conditions or treatments  Talk to your doctor, nurse or pharmacist before following any medical regimen to see if it is safe and effective for you  Cholesterol and Your Health   AMBULATORY CARE:   Cholesterol  is a waxy, fat-like substance  Your body uses cholesterol to make hormones and new cells, and to protect nerves  Cholesterol is made by your body  It also comes from certain foods you eat, such as meat and dairy products   Your healthcare provider can help you set goals for your cholesterol levels  He or she can help you create a plan to meet your goals  Cholesterol level goals: Your cholesterol level goals depend on your risk for heart disease, your age, and your other health conditions  The following are general guidelines:  · Total cholesterol  includes low-density lipoprotein (LDL), high-density lipoprotein (HDL), and triglyceride levels  The total cholesterol level should be lower than 200 mg/dL and is best at about 150 mg/dL  · LDL cholesterol  is called bad cholesterol  because it forms plaque in your arteries  As plaque builds up, your arteries become narrow, and less blood flows through  When plaque decreases blood flow to your heart, you may have chest pain  If plaque completely blocks an artery that brings blood to your heart, you may have a heart attack  Plaque can break off and form blood clots  Blood clots may block arteries in your brain and cause a stroke  The level should be less than 130 mg/dL and is best at about 100 mg/dL  · HDL cholesterol  is called good cholesterol  because it helps remove LDL cholesterol from your arteries  It does this by attaching to LDL cholesterol and carrying it to your liver  Your liver breaks down LDL cholesterol so your body can get rid of it  High levels of HDL cholesterol can help prevent a heart attack and stroke  Low levels of HDL cholesterol can increase your risk for heart disease, heart attack, and stroke  The level should be 60 mg/dL or higher  · Triglycerides  are a type of fat that store energy from foods you eat  High levels of triglycerides also cause plaque buildup  This can increase your risk for a heart attack or stroke  If your triglyceride level is high, your LDL cholesterol level may also be high  The level should be less than 150 mg/dL      Any of the following can increase your risk for high cholesterol:   · Smoking cigarettes    · Being overweight or obese, or not getting enough exercise    · Drinking large amounts of alcohol    · A medical condition such as hypertension (high blood pressure) or diabetes    · Certain genes passed from your parents to you    · Age older than 65 years    What you need to know about having your cholesterol levels checked: Adults 21to 39years of age should have their cholesterol levels checked every 4 to 6 years  Adults 45 years or older should have their cholesterol checked every 1 to 2 years  You may need your cholesterol checked more often, or at a younger age, if you have risk factors for heart disease  You may also need to have your cholesterol checked more often if you have other health conditions, such as diabetes  Blood tests are used to check cholesterol levels  Blood tests measure your levels of triglycerides, LDL cholesterol, and HDL cholesterol  How healthy fats affect your cholesterol levels:  Healthy fats, also called unsaturated fats, help lower LDL cholesterol and triglyceride levels  Healthy fats include the following:  · Monounsaturated fats  are found in foods such as olive oil, canola oil, avocado, nuts, and olives  · Polyunsaturated fats,  such as omega 3 fats, are found in fish, such as salmon, trout, and tuna  They can also be found in plant foods such as flaxseed, walnuts, and soybeans  How unhealthy fats affect your cholesterol levels:  Unhealthy fats increase LDL cholesterol and triglyceride levels  They are found in foods high in cholesterol, saturated fat, and trans fat:  · Cholesterol  is found in eggs, dairy, and meat  · Saturated fat  is found in butter, cheese, ice cream, whole milk, and coconut oil  Saturated fat is also found in meat, such as sausage, hot dogs, and bologna  · Trans fat  is found in liquid oils and is used in fried and baked foods  Foods that contain trans fats include chips, crackers, muffins, sweet rolls, microwave popcorn, and cookies      Treatment  for high cholesterol will also decrease your risk of heart disease, heart attack, and stroke  Treatment may include any of the following:  · Lifestyle changes  may include food, exercise, weight loss, and quitting smoking  You may also need to decrease the amount of alcohol you drink  Your healthcare provider will want you to start with lifestyle changes  Other treatment may be added if lifestyle changes are not enough  Your healthcare provider may recommend you work with a team to manage hyperlipidemia  The team may include medical experts such as a dietitian, an exercise or physical therapist, and a behavior therapist  Your family members may be included in helping you create lifestyle changes  · Medicines  may be given to lower your LDL cholesterol, triglyceride levels, or total cholesterol level  You may need medicines to lower your cholesterol if any of the following is true:    ? You have a history of stroke, TIA, unstable angina, or a heart attack  ? Your LDL cholesterol level is 190 mg/dL or higher  ? You are age 36 to 76 years, have diabetes or heart disease risk factors, and your LDL cholesterol is 70 mg/dL or higher  · Supplements  include fish oil, red yeast rice, and garlic  Fish oil may help lower your triglyceride and LDL cholesterol levels  It may also increase your HDL cholesterol level  Red yeast rice may help decrease your total cholesterol level and LDL cholesterol level  Garlic may help lower your total cholesterol level  Do not take any supplements without talking to your healthcare provider  Food changes you can make to lower your cholesterol levels:  A dietitian can help you create a healthy eating plan  He or she can show you how to read food labels and choose foods low in saturated fat, trans fats, and cholesterol  · Decrease the total amount of fat you eat  Choose lean meats, fat-free or 1% fat milk, and low-fat dairy products, such as yogurt and cheese   Try to limit or avoid red meats  Limit or do not eat fried foods or baked goods, such as cookies  · Replace unhealthy fats with healthy fats  Cook foods in olive oil or canola oil  Choose soft margarines that are low in saturated fat and trans fat  Seeds, nuts, and avocados are other examples of healthy fats  · Eat foods with omega-3 fats  Examples include salmon, tuna, mackerel, walnuts, and flaxseed  Eat fish 2 times per week  Pregnant women should not eat fish that have high levels of mercury, such as shark, swordfish, and joseph mackerel  · Increase the amount of high-fiber foods you eat  High-fiber foods can help lower your LDL cholesterol  Aim to get between 20 and 30 grams of fiber each day  Fruits and vegetables are high in fiber  Eat at least 5 servings each day  Other high-fiber foods are whole-grain or whole-wheat breads, pastas, or cereals, and brown rice  Eat 3 ounces of whole-grain foods each day  Increase fiber slowly  You may have abdominal discomfort, bloating, and gas if you add fiber to your diet too quickly  · Eat healthy protein foods  Examples include low-fat dairy products, skinless chicken and turkey, fish, and nuts  · Limit foods and drinks that are high in sugar  Your dietitian or healthcare provider can help you create daily limits for high-sugar foods and drinks  The limit may be lower if you have diabetes or another health condition  Limits can also help you lose weight if needed  Lifestyle changes you can make to lower your cholesterol levels:   · Maintain a healthy weight  Ask your healthcare provider what a healthy weight is for you  Ask him or her to help you create a weight loss plan if needed  Weight loss can decrease your total cholesterol and triglyceride levels  Weight loss may also help keep your blood pressure at a healthy level  · Be physically active throughout the day    Physical activity, such as exercise, can help lower your total cholesterol level and maintain a healthy weight  Physical activity can also help increase your HDL cholesterol level  Work with your healthcare provider to create an program that is right for you  Get at least 30 to 40 minutes of moderate physical activity most days of the week  Examples of exercise include brisk walking, swimming, or biking  Also include strength training at least 2 times each week  Your healthcare providers can help you create a physical activity plan  · Do not smoke  Nicotine and other chemicals in cigarettes and cigars can raise your cholesterol levels  Ask your healthcare provider for information if you currently smoke and need help to quit  E-cigarettes or smokeless tobacco still contain nicotine  Talk to your healthcare provider before you use these products  · Limit or do not drink alcohol  Alcohol can increase your triglyceride levels  Ask your healthcare provider before you drink alcohol  Ask how much is okay for you to drink in 24 hours or 1 week  Follow up with your doctor as directed:  Write down your questions so you remember to ask them during your visits  © Copyright [employer] 2021 Information is for End User's use only and may not be sold, redistributed or otherwise used for commercial purposes  All illustrations and images included in CareNotes® are the copyrighted property of [employer]  or [facility] Brett Redman   The above information is an  only  It is not intended as medical advice for individual conditions or treatments  Talk to your doctor, nurse or pharmacist before following any medical regimen to see if it is safe and effective for you

## 2021-08-24 NOTE — LETTER
August 24, 2021     Patient: Michelle Ashley III   YOB: 1980   Date of Visit: 8/24/2021       To Whom it May Concern:    Radha Camacho is under my professional care  He was seen in my office on 8/24/2021  If you have any questions or concerns, please don't hesitate to call           Sincerely,          Albertina Barros, [address] [facility]

## 2021-12-01 ENCOUNTER — NURSE TRIAGE (OUTPATIENT)
Dept: OTHER | Facility: OTHER | Age: 41
End: 2021-12-01

## 2021-12-02 ENCOUNTER — OFFICE VISIT (OUTPATIENT)
Dept: URGENT CARE | Facility: CLINIC | Age: 41
End: 2021-12-02
Payer: COMMERCIAL

## 2021-12-02 VITALS
RESPIRATION RATE: 18 BRPM | DIASTOLIC BLOOD PRESSURE: 82 MMHG | WEIGHT: 194 LBS | SYSTOLIC BLOOD PRESSURE: 141 MMHG | OXYGEN SATURATION: 99 % | HEIGHT: 70 IN | HEART RATE: 81 BPM | BODY MASS INDEX: 27.77 KG/M2 | TEMPERATURE: 97 F

## 2021-12-02 DIAGNOSIS — R05.9 COUGH: Primary | ICD-10-CM

## 2021-12-02 PROCEDURE — 0241U HB NFCT DS VIR RESP RNA 4 TRGT: CPT | Performed by: PHYSICIAN ASSISTANT

## 2021-12-02 PROCEDURE — 99214 OFFICE O/P EST MOD 30 MIN: CPT | Performed by: PHYSICIAN ASSISTANT

## 2021-12-02 RX ORDER — BROMPHENIRAMINE MALEATE, PSEUDOEPHEDRINE HYDROCHLORIDE, AND DEXTROMETHORPHAN HYDROBROMIDE 2; 30; 10 MG/5ML; MG/5ML; MG/5ML
5 SYRUP ORAL 4 TIMES DAILY PRN
Qty: 120 ML | Refills: 0 | Status: SHIPPED | OUTPATIENT
Start: 2021-12-02 | End: 2022-04-25

## 2021-12-02 RX ORDER — DEXTROAMPHETAMINE SACCHARATE, AMPHETAMINE ASPARTATE MONOHYDRATE, DEXTROAMPHETAMINE SULFATE AND AMPHETAMINE SULFATE 7.5; 7.5; 7.5; 7.5 MG/1; MG/1; MG/1; MG/1
30 CAPSULE, EXTENDED RELEASE ORAL EVERY MORNING
COMMUNITY

## 2021-12-03 LAB
FLUAV RNA RESP QL NAA+PROBE: NEGATIVE
FLUBV RNA RESP QL NAA+PROBE: NEGATIVE
RSV RNA RESP QL NAA+PROBE: NEGATIVE
SARS-COV-2 RNA RESP QL NAA+PROBE: NEGATIVE

## 2022-03-14 ENCOUNTER — HOSPITAL ENCOUNTER (OUTPATIENT)
Dept: RADIOLOGY | Facility: HOSPITAL | Age: 42
Discharge: HOME | End: 2022-03-14
Payer: COMMERCIAL

## 2022-03-14 ENCOUNTER — OFFICE VISIT (OUTPATIENT)
Dept: OBGYN CLINIC | Facility: CLINIC | Age: 42
End: 2022-03-14
Payer: COMMERCIAL

## 2022-03-14 VITALS — HEIGHT: 70 IN | OXYGEN SATURATION: 99 % | WEIGHT: 197.8 LBS | BODY MASS INDEX: 28.32 KG/M2 | HEART RATE: 107 BPM

## 2022-03-14 DIAGNOSIS — M25.562 LEFT KNEE PAIN, UNSPECIFIED CHRONICITY: ICD-10-CM

## 2022-03-14 DIAGNOSIS — S83.412A SPRAIN OF MEDIAL COLLATERAL LIGAMENT OF LEFT KNEE, INITIAL ENCOUNTER: ICD-10-CM

## 2022-03-14 DIAGNOSIS — M25.562 ACUTE PAIN OF LEFT KNEE: Primary | ICD-10-CM

## 2022-03-14 DIAGNOSIS — M25.462 EFFUSION OF LEFT KNEE: ICD-10-CM

## 2022-03-14 PROCEDURE — 73562 X-RAY EXAM OF KNEE 3: CPT

## 2022-03-14 PROCEDURE — 99204 OFFICE O/P NEW MOD 45 MIN: CPT | Performed by: PHYSICIAN ASSISTANT

## 2022-03-14 NOTE — LETTER
March 14, 2022     Patient: Jose Castle   YOB: 1980   Date of Visit: 3/14/2022       To Whom it May Concern:    Esmer Cunningham is under my professional care  He was seen in my office on 3/14/2022  He may return to work with limitations : sedentary duty  If work is unable to accommodate this, then remain out until follow up visit       If you have any questions or concerns, please don't hesitate to call  Sincerely,          Suresh Jane PA-C        CC: Vitor Guerrero   Borden III

## 2022-03-14 NOTE — PROGRESS NOTES
Assessment/Plan   Diagnoses and all orders for this visit:    Left knee pain, acute    Left knee effusion    Sprain of medial collateral ligament of left knee, initial encounter  -     Possible concurrent ACL rupture  -     MRI knee left wo contrast  -     Hinged knee brace fitted and dispensed  -     Start PT now  -     Ice, NSAIDs as needed  -     Sedentary duty  -     Follow up with Dr Lauren Sandoval after MRI          Subjective   Patient ID: Shamir Hernandez is a 42 y o  male  Vitals:    03/14/22 1601   Pulse: (!) 107   SpO2: 99%     36yo male comes in for an evaluation of his left knee  He was injured on 3-12-22 when his dog ran into his leg  He twisted, fell, and heard a loud pop  Since then, he has been having primarily medial knee pain  The pain is dull in character, moderate in severity, pain does not radiate and is not associated with numbness          The following portions of the patient's history were reviewed and updated as appropriate: allergies, current medications, past family history, past medical history, past social history, past surgical history and problem list     Review of Systems  Ortho Exam  Past Medical History:   Diagnosis Date    Concussion     Pneumonia of right lower lobe due to infectious organism 6/15/2021    Sarcoidosis      Past Surgical History:   Procedure Laterality Date    BRONCHOSCOPY N/A 11/15/2016    Procedure: Cindy Arambula;  Surgeon: Rafael Ricci MD;  [facility];  Service: ; DIAGNOSTIC; LAST ASSESSED: 12/13/16    EYE SURGERY      BIOPSY EYELID    FLEXIBLE BRONCHOSCOPY      (DIAGNOSTIC) RESOLVED: 11/15/16    CA BRONCHOSCOPY NEEDLE BX TRACHEA MAIN STEM&/BRON N/A 11/15/2016    Procedure: EBUS;  Surgeon: Rafael Ricci MD;  [facility];  Service: Thoracic; FOR PERIPH LESIONS; RESOLVED: 11/15/16     Family History   Problem Relation Age of Onset    No Known Problems Mother     Hypertension Father      Social History     Occupational History    Occupation: WORKS AS A [employer] IN HOME [employer]   Tobacco Use    Smoking status: Never Smoker    Smokeless tobacco: Never Used   Vaping Use    Vaping Use: Never used   Substance and Sexual Activity    Alcohol use: Yes     Comment: several beers per week    Drug use: No    Sexual activity: Yes     Partners: Female       Review of Systems   Constitutional: Negative  HENT: Negative  Eyes: Negative  Respiratory: Negative  Cardiovascular: Negative  Gastrointestinal: Negative  Endocrine: Negative  Genitourinary: Negative  Musculoskeletal: As below      Allergic/Immunologic: Negative  Neurological: Negative  Hematological: Negative  Psychiatric/Behavioral: Negative  Objective   Physical Exam    · Constitutional: Awake, Alert, Oriented  · Eyes: EOMI  · Psych: Mood and affect appropriate  · Heart: regular rate   · Lungs: No audible wheezing  · Abdomen: No guarding  · Lymph: no lymphedema   left Knee:  - Appearance   No swelling, discoloration, deformity, or ecchymosis  - Effusion   mild  - Palpation   + Tenderness medial joint line and MCL  No tenderness of the patella, patellar tendon, pes anserine, lateral joint line,  LCL, medial / lateral plateau  - ROM   Extension: 0 and Flexion: 120  - Special Tests   + valgus laxity   Marlen's Test negative, Lachman's Test negative, Anterior Drawer Test negative, Posterior Drawer Test negative, Varus Stress Test negative and Patellar apprehension negative  - Motor   normal 5/5 in all planes  - NVI distally    I have personally reviewed pertinent films in PACS and my interpretation is no acute displaced fracture on xray

## 2022-03-30 ENCOUNTER — HOSPITAL ENCOUNTER (OUTPATIENT)
Dept: RADIOLOGY | Facility: HOSPITAL | Age: 42
Discharge: HOME | End: 2022-03-30
Payer: COMMERCIAL

## 2022-03-30 DIAGNOSIS — M25.562 ACUTE PAIN OF LEFT KNEE: ICD-10-CM

## 2022-03-30 DIAGNOSIS — S83.412A SPRAIN OF MEDIAL COLLATERAL LIGAMENT OF LEFT KNEE, INITIAL ENCOUNTER: ICD-10-CM

## 2022-03-30 PROCEDURE — 73721 MRI JNT OF LWR EXTRE W/O DYE: CPT

## 2022-03-30 PROCEDURE — G1004 CDSM NDSC: HCPCS

## 2022-04-05 RX ORDER — METHYLPHENIDATE HYDROCHLORIDE 100 MG/1
CAPSULE ORAL
COMMUNITY
Start: 2022-03-16 | End: 2022-04-25

## 2022-04-07 ENCOUNTER — OFFICE VISIT (OUTPATIENT)
Dept: OBGYN CLINIC | Facility: MEDICAL CENTER | Age: 42
End: 2022-04-07
Payer: COMMERCIAL

## 2022-04-07 VITALS
BODY MASS INDEX: 27.49 KG/M2 | WEIGHT: 192 LBS | SYSTOLIC BLOOD PRESSURE: 154 MMHG | DIASTOLIC BLOOD PRESSURE: 86 MMHG | HEART RATE: 86 BPM | HEIGHT: 70 IN

## 2022-04-07 DIAGNOSIS — M25.562 ACUTE PAIN OF LEFT KNEE: Primary | ICD-10-CM

## 2022-04-07 PROCEDURE — 99214 OFFICE O/P EST MOD 30 MIN: CPT | Performed by: PHYSICAL MEDICINE & REHABILITATION

## 2022-04-07 NOTE — LETTER
To Whom It May Concern,    Krish Leonardo is under my professional care  He was seen in my office on April 7, 2022  He is cleared to return to work with the following restrictions:    [employer] duty only  Please excuse Beto Paul III from any work missed  If you have any questions or concerns, please do not hesitate to call          Sincerely,          Teo Pardo, DO

## 2022-04-07 NOTE — PROGRESS NOTES
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  He will be starting physical therapy this upcoming Monday  He should use his hinged knee brace for when he will be walking or standing for long periods of time  I will see him back in 3-4 weeks to reassess  Imaging Studies (I personally reviewed images in PACS and report):  Left knee x-rays most recent to this encounter reviewed  These images show no acute osseous abnormalities or severe degeneration  Left knee MRI:  "Tiny partial-thickness tear of the proximal medial collateral ligament /grade 2 sprain      Mild grade 2 sprain of the medial patellofemoral ligament      Small contusion anterolateral aspect of the lateral condyle  There is no corresponding contusion of the inferomedial patella to suggest recent lateral patellar dislocation relocation impaction injury      Mild degree of lateral patella tilt and translation with mild cartilage thinning along the lateral facet of the patella  Tiny fissuring of the central eminence of the patellar articular cartilage      Trace joint effusion and small Baker's cyst "    The patient's pertinent emergency department/urgent care/referring physician's notes were reviewed  Return in about 4 weeks (around 5/5/2022)  Patient is in agreement with the above plan  HPI:  Prince Chaparro is a 42 y o  male  who presents for evaluation of   Chief Complaint   Patient presents with    Left Knee - Pain       Onset/Mechanism: Mid-March- he was carrying a box and his large dog hit into the outside of his knee    Location: Medial knee   Radiation: Front of the knee  Provocative:  Moving the leg to the side  Severity: Feeling better  Associated Symptoms: Denies  Treatment so far: Hinged knee brace  Following history reviewed and updated:  Past Medical History:   Diagnosis Date    Concussion     Pneumonia of right lower lobe due to infectious organism 6/15/2021    Sarcoidosis      Past Surgical History:   Procedure Laterality Date    BRONCHOSCOPY N/A 11/15/2016    Procedure: Herman Sas;  Surgeon: Leonora Guerrero MD;  [facility];  Service: ; DIAGNOSTIC; LAST ASSESSED: 12/13/16    EYE SURGERY      BIOPSY EYELID    FLEXIBLE BRONCHOSCOPY      (DIAGNOSTIC) RESOLVED: 11/15/16    IN BRONCHOSCOPY NEEDLE BX TRACHEA MAIN STEM&/BRON N/A 11/15/2016    Procedure: EBUS;  Surgeon: Leonora Guerrero MD;  [facility];  Service: Thoracic; FOR PERIPH LESIONS; RESOLVED: 11/15/16     Social History   Social History     Substance and Sexual Activity   Alcohol Use Yes    Comment: several beers per week     Social History     Substance and Sexual Activity   Drug Use No     Social History     Tobacco Use   Smoking Status Never Smoker   Smokeless Tobacco Never Used     Family History   Problem Relation Age of Onset    No Known Problems Mother     Hypertension Father      No Known Allergies     Constitutional:  /86   Pulse 86   Ht 5' 10" (1 778 m)   Wt 87 1 kg (192 lb)   BMI 27 55 kg/m²    General: NAD  Eyes: Anicteric sclerae  Neck: Supple  Lungs: Unlabored breathing  Cardiovascular: No lower extremity edema  Skin: Intact without erythema  Neurologic: Sensation intact to light touch  Psychiatric: Mood and affect are appropriate  Left Knee Exam     Tenderness   The patient is experiencing no tenderness       Range of Motion   Extension: normal     Tests   Varus: negative Valgus: positive    Other   Erythema: absent  Scars: absent  Sensation: normal  Pulse: present  Swelling: none  Effusion: no effusion present Procedures

## 2022-04-11 ENCOUNTER — EVALUATION (OUTPATIENT)
Dept: PHYSICAL THERAPY | Facility: REHABILITATION | Age: 42
End: 2022-04-11
Payer: COMMERCIAL

## 2022-04-11 DIAGNOSIS — M25.462 EFFUSION OF LEFT KNEE: ICD-10-CM

## 2022-04-11 DIAGNOSIS — S83.412A SPRAIN OF MEDIAL COLLATERAL LIGAMENT OF LEFT KNEE, INITIAL ENCOUNTER: ICD-10-CM

## 2022-04-11 DIAGNOSIS — M25.562 ACUTE PAIN OF LEFT KNEE: ICD-10-CM

## 2022-04-11 PROCEDURE — 97161 PT EVAL LOW COMPLEX 20 MIN: CPT

## 2022-04-11 PROCEDURE — 97112 NEUROMUSCULAR REEDUCATION: CPT

## 2022-04-11 NOTE — PROGRESS NOTES
PT Evaluation     Today's date: 2022  Patient name: Tomás Jordan III  : 1980  MRN: [id number]  Referring provider: Mary Felix  Dx:   Encounter Diagnosis     ICD-10-CM    1  Acute pain of left knee  M25 562 Ambulatory Referral to Physical Therapy   2  Sprain of medial collateral ligament of left knee, initial encounter  S83 412A Ambulatory Referral to Physical Therapy   3  Effusion of left knee  M25 462 Ambulatory Referral to Physical Therapy       Start Time:   Stop Time:   Total time in clinic (min): 37 minutes    Assessment  Assessment details: Marek Mei is a pleasant 42 y o  male who presents with left knee pain after his dog ran into his leg and he twisted and fell  Currently pt's limitations are glute weakness and decreased single leg stability  The primary movement problem is resulting in decreased single limb stability and limiting his ability to go to work, lift, sleep and squat to  objects from the floor  No further referral appears necessary at this time based upon examination results  I expect he will improve to pre-injury status in 4 weeks  The patient's greatest concerns are the pain he is experiencing and fear of not being able to keep active  Patient would benefit from skilled physical therapy to address his limitations and allow him to return to a pain free lifestyle  Problem List:  1) Decreased single limb stability  2) Decreased glute strength    Etiologic factors include none recalled by the patient  Impairments: abnormal or restricted ROM, activity intolerance, impaired physical strength, lacks appropriate home exercise program, pain with function, weight-bearing intolerance and poor posture     Symptom irritability: lowUnderstanding of Dx/Px/POC: excellentPrognosis details: Positive prognostic indicators include positive attitude toward recovery and good understanding of diagnosis and treatment plan options    Negative prognostic indicators include hypertension  Goals  ST-4 weeks  Patient will be independent with home exercise program    Patient will be able to manage symptoms independently  Patient will decrease pain by 25-50%    LTG: by discharge  Patient will improve FOTO to goal  Patient will report minimal (1-2/10) pain with aggravating activities to display improvements in overall functional status  Patient will perform deep squat with minimal (1-2/10) discomfort  Patient will perform single limb balance on airex for 1 min to display improved single limb stability    Plan  Plan details: 2x/wk for 4 weeks with discharge to [facility]  Patient would benefit from: skilled physical therapy  Planned modality interventions: cryotherapy, thermotherapy: hydrocollator packs and unattended electrical stimulation  Planned therapy interventions: IADL retraining, joint mobilization, manual therapy, massage, ADL training, activity modification, abdominal trunk stabilization, ADL retraining, balance, balance/weight bearing training, neuromuscular re-education, body mechanics training, behavior modification, strengthening, stretching, therapeutic activities, therapeutic exercise, therapeutic training, transfer training, graded exercise, graded motor, home exercise program, graded activity, gait training, functional ROM exercises, patient education, postural training and flexibility  Frequency: 2x week  Duration in visits: 16  Duration in weeks: 8  Treatment plan discussed with: patient        Subjective Evaluation    History of Present Illness  Mechanism of injury: Ana Stanton is a 42 y o  male presenting to physical therapy on 22 with referral from MD for left knee pain that began about a month ago when the dog ran into him and he twisted and fell  Reports he overall feels pretty good  Has some pain when he sleeps a certain pain and when he feels his knee cave in  Had MRI which showed grade 2 sprain of MPFL and MCL  Denies numbness/tingling   Reports some sleep disturbance, but only sometimes  Denies feelings of locking or clicking in knee  Reports on episodes of giving out, but has felt close before  Works in maintenance for property  Feels fine riding his bike  Quality of life: good    Pain  Current pain ratin  At best pain ratin  At worst pain rating: 3  Quality: sharp and dull ache  Aggravating factors: stair climbing and walking  Progression: improved      Diagnostic Tests  MRI studies: abnormal  Patient Goals  Patient goals for therapy: decreased pain, independence with ADLs/IADLs, return to sport/leisure activities, increased strength and increased motion          Objective  Palpation: no tenderness  Myotomes: all intact b/l  Dermatome: all intact b/l  Swelling: none noted          Reflexes: L3-4: 2+ b/l      S1: 2+ b/l               Squat assess: good depth and technique  Single leg Squat: knee vaglus noted, decreased stability  Singe Leg Stance: challenging on L, decreased stability    Ligamentous Testing:   Varus/Valgus (Cruciate bias): negative   Varus/Valgus (Collateral bias): positive    Meniscal Tests:  Catching/Clicking/Locking no  Joint Line Tenderness: no  Pain with knee flexion overpressure: no  Pain with knee extension overpressure: no        MMT         AROM          PROM    Hip       L       R        L           R      L     R   Flex  4 4       Extn  Abd  Add  IR          ER          G  Max 4+ 4+       G  Med 4 4+       Iliop                     Knee         Extension 4+ 4+   [facility]   Flexion     [facility]            Ankle         Dorsi Flexion         Plantar Flexion              Precautions: standard    Manuals                                                                 Neuro Re-Ed             SLRs 15x HEP GTB            Leg extensions             Squats with TB             Single leg flex/abd/ext 15x HEP GTB            Single leg squat             Lateral band walk (ABD or ER) HEP/education 15' HEP, KEVAN/MPFL            Ther Ex 4/11            Bike             VG                                                                                           Ther Activity                                       Gait Training                                       Modalities

## 2022-04-14 ENCOUNTER — OFFICE VISIT (OUTPATIENT)
Dept: PHYSICAL THERAPY | Facility: REHABILITATION | Age: 42
End: 2022-04-14
Payer: COMMERCIAL

## 2022-04-14 DIAGNOSIS — S83.412A SPRAIN OF MEDIAL COLLATERAL LIGAMENT OF LEFT KNEE, INITIAL ENCOUNTER: ICD-10-CM

## 2022-04-14 DIAGNOSIS — M25.562 ACUTE PAIN OF LEFT KNEE: Primary | ICD-10-CM

## 2022-04-14 DIAGNOSIS — M25.462 EFFUSION OF LEFT KNEE: ICD-10-CM

## 2022-04-14 PROCEDURE — 97110 THERAPEUTIC EXERCISES: CPT

## 2022-04-14 PROCEDURE — 97112 NEUROMUSCULAR REEDUCATION: CPT

## 2022-04-19 ENCOUNTER — APPOINTMENT (OUTPATIENT)
Dept: PHYSICAL THERAPY | Facility: REHABILITATION | Age: 42
End: 2022-04-19
Payer: COMMERCIAL

## 2022-04-21 ENCOUNTER — OFFICE VISIT (OUTPATIENT)
Dept: PHYSICAL THERAPY | Facility: REHABILITATION | Age: 42
End: 2022-04-21
Payer: COMMERCIAL

## 2022-04-21 DIAGNOSIS — S83.412A SPRAIN OF MEDIAL COLLATERAL LIGAMENT OF LEFT KNEE, INITIAL ENCOUNTER: ICD-10-CM

## 2022-04-21 DIAGNOSIS — M25.462 EFFUSION OF LEFT KNEE: ICD-10-CM

## 2022-04-21 DIAGNOSIS — M25.562 ACUTE PAIN OF LEFT KNEE: Primary | ICD-10-CM

## 2022-04-21 PROCEDURE — 97110 THERAPEUTIC EXERCISES: CPT

## 2022-04-21 PROCEDURE — 97112 NEUROMUSCULAR REEDUCATION: CPT

## 2022-04-21 NOTE — PROGRESS NOTES
Daily Note     Today's date: 2022  Patient name: Maggie Chavarria III  : 1980  MRN: [id number]  Referring provider: Cici Argueta  Dx:   Encounter Diagnosis     ICD-10-CM    1  Acute pain of left knee  M25 562    2  Sprain of medial collateral ligament of left knee, initial encounter  S83 412A    3  Effusion of left knee  M25 462                   Subjective: Pt reports L knee has been doing well with no new complaints since LV  States he woke up one morning earlier this week and it was bothering him, but symptoms improved as he got up and started moving throughout the day  "Other than that, I haven't noticed it "      Objective: See treatment diary below      Assessment: Tolerated treatment well  Continued with program as outlined below  Progressed with addition of TB resisted squats, as well as increased reps as noted  Was able to perform all exercise with no complaints of pain  Patient would benefit from continued PT to further improve strength and maximize overall function, with reduced pain levels  Plan: Continue per plan of care        Precautions: standard    Manuals                                                               Neuro Re-Ed           SLRs 15x HEP GTB 5x5 3lb           Leg extensions  2up 1dwn 5x5 20lbs 2up 1dwn 5x5 20lbs          Squats with TB   RHB  2x10          Single leg flex/abd/ext 15x HEP GTB            Single leg squat  airex 4x5 airex 4x5          Lateral band walk (ABD or ER)  ABD RHB 3 laps    ER 5 laps at bar YMB ABD RHB 3 laps    ER 5 laps at bar YMB          SLS w/glute squeeze  10x15" airex 10x15" airex          bosu lunge  2x10 3x8                                    HEP/education 13' HEP, MCL/MPFL            Ther Ex           Bike  8' 8'          VG  5' 5'                                                                                        Ther Activity                                       Gait Training Modalities

## 2022-04-25 ENCOUNTER — APPOINTMENT (OUTPATIENT)
Dept: PHYSICAL THERAPY | Facility: REHABILITATION | Age: 42
End: 2022-04-25
Payer: COMMERCIAL

## 2022-04-27 ENCOUNTER — APPOINTMENT (OUTPATIENT)
Dept: PHYSICAL THERAPY | Facility: REHABILITATION | Age: 42
End: 2022-04-27
Payer: COMMERCIAL

## 2022-04-28 ENCOUNTER — TELEPHONE (OUTPATIENT)
Dept: OBGYN CLINIC | Facility: OTHER | Age: 42
End: 2022-04-28

## 2022-04-28 NOTE — TELEPHONE ENCOUNTER
Patient called in he was sup[osred to have an appointment today but had to reschedule to 05-24  He asked if he can see another provider sooner , who can release him to full duty , no restriction      I advised he can call us to see if the are any cancellations but lets stay on Dr Yelitza Ramirez    C/b # [phone]

## 2022-04-29 ENCOUNTER — OFFICE VISIT (OUTPATIENT)
Dept: PHYSICAL THERAPY | Facility: REHABILITATION | Age: 42
End: 2022-04-29
Payer: COMMERCIAL

## 2022-04-29 DIAGNOSIS — S83.412A SPRAIN OF MEDIAL COLLATERAL LIGAMENT OF LEFT KNEE, INITIAL ENCOUNTER: ICD-10-CM

## 2022-04-29 DIAGNOSIS — M25.562 ACUTE PAIN OF LEFT KNEE: Primary | ICD-10-CM

## 2022-04-29 DIAGNOSIS — M25.462 EFFUSION OF LEFT KNEE: ICD-10-CM

## 2022-04-29 PROCEDURE — 97110 THERAPEUTIC EXERCISES: CPT

## 2022-04-29 PROCEDURE — 97112 NEUROMUSCULAR REEDUCATION: CPT

## 2022-04-29 NOTE — PROGRESS NOTES
Daily Note     Today's date: 2022  Patient name: Smita Bishop III  : 1980  MRN: [id number]  Referring provider: Delmar Yates  Dx:   Encounter Diagnosis     ICD-10-CM    1  Acute pain of left knee  M25 562    2  Sprain of medial collateral ligament of left knee, initial encounter  S83 412A    3  Effusion of left knee  M25 462        Start Time: 4007  Stop Time: 1348  Total time in clinic (min): 34 minutes    Subjective: Pt reports he is doing well  Felt his knee a couple times, but nothing that limits him  Objective: See treatment diary below    Assessment: Tolerated treatment well  No discomfort during exercises today  Patient would benefit from continued PT to further improve strength and maximize overall function, with reduced pain levels  Plan: Continue per plan of care  Add jumping/landing exercises       Precautions: standard    Manuals                                          Neuro Re-Ed      SLRs 15x HEP GTB 5x5 3lb       Leg extensions  2up 1dwn 5x5 20lbs 2up 1dwn 5x5 20lbs 2up 1 dwn 35lbs     Squats with TB   RHB  2x10      Single leg flex/abd/ext 15x HEP GTB   YMB 15x ea     Single leg squat  airex 4x5 airex 4x5 5x5 airex     Lateral band walk (ABD or ER)  ABD RHB 3 laps    ER 5 laps at bar YMB ABD RHB 3 laps    ER 5 laps at bar YMB ABD 3 laps 40ft     SLS w/glute squeeze  10x15" airex 10x15" airex Blaze pods 4x30"      bosu lunge  2x10 3x8 25x                       HEP/education 13' HEP, MCL/MPFL        Ther Ex      Bike  8' 8' 8'     VG  5' 5' Launches 3x5 L5                                                           Ther Activity                           Gait Training                           Modalities

## 2022-04-29 NOTE — TELEPHONE ENCOUNTER
I can r/s him to Monday, 5/2  I l/m for him that if he wanted a sooner appt  To please return my call  Waiting for a call back

## 2022-05-03 ENCOUNTER — OFFICE VISIT (OUTPATIENT)
Dept: PHYSICAL THERAPY | Facility: REHABILITATION | Age: 42
End: 2022-05-03
Payer: COMMERCIAL

## 2022-05-03 DIAGNOSIS — M25.562 ACUTE PAIN OF LEFT KNEE: Primary | ICD-10-CM

## 2022-05-03 DIAGNOSIS — M25.462 EFFUSION OF LEFT KNEE: ICD-10-CM

## 2022-05-03 DIAGNOSIS — S83.412A SPRAIN OF MEDIAL COLLATERAL LIGAMENT OF LEFT KNEE, INITIAL ENCOUNTER: ICD-10-CM

## 2022-05-03 PROCEDURE — 97110 THERAPEUTIC EXERCISES: CPT | Performed by: PHYSICAL THERAPIST

## 2022-05-03 PROCEDURE — 97112 NEUROMUSCULAR REEDUCATION: CPT | Performed by: PHYSICAL THERAPIST

## 2022-05-03 NOTE — PROGRESS NOTES
Daily Note     Today's date: 5/3/2022  Patient name: Smita Bishop III  : 1980  MRN: [id number]  Referring provider: Delmar Yates  Dx:   Encounter Diagnosis     ICD-10-CM    1  Acute pain of left knee  M25 562    2  Sprain of medial collateral ligament of left knee, initial encounter  S83 412A    3  Effusion of left knee  M25 462        Start Time: 1120  Stop Time: 1200  Total time in clinic (min): 40 minutes    Subjective: Pt states he had an episode since last visit where the knee gave out and twinged a bit  Objective: See treatment diary below      Assessment: Pt was very challenged by SLS balance on rocker and balance with ball bouncing requiring occasional external support prevent LOB  Cues given during step downs to prevent knee valgus  Pt will benefit from continued skilled outpatient PT to improve strength, to address therapy goals, to reduce pain, and improve function  Plan: Continue per plan of care  Progress treatment as tolerated         Precautions: standard    Manuals 4/11 4/14 4/21 4/29 5/3                                        Neuro Re-Ed  5x5    SLRs 15x HEP GTB 5x5 3lb       Leg extensions  2up 1dwn 5x5 20lbs 2up 1dwn 5x5 20lbs 2up 1 dwn 35lbs 2 up 1 down 5x5    Squats with TB   RHB  2x10  bosu squats dome down 2xF    Single leg flex/abd/ext 15x HEP GTB   YMB 15x ea     Single leg squat  airex 4x5 airex 4x5 5x5 airex 5x5 airex    Lateral band walk (ABD or ER)  ABD RHB 3 laps    ER 5 laps at bar YMB ABD RHB 3 laps    ER 5 laps at bar YMB ABD 3 laps 40ft RHB 50'x4 abd    SLS w/glute squeeze  10x15" airex 10x15" airex Blaze pods 4x30"      bosu split squats     x25 with orange Jband valgus counter force    bosu lunge  2x10 3x8 25x With valgus counter force with orange J band x25    Step downs with mirror feedback     8"x25    bilat rocker balance with Pball bouncing     60"x2    S/l SLS on rocker AP static     30"x4    HEP/education 13' HEP, MCL/MPFL Ther Ex 4/11 4/14 4/21 4/29 5/3    Bike  8' 8' 8' L3 8'    VG  5' 5' Launches 3x5 L5                                                           Ther Activity                           Gait Training                           Modalities

## 2022-05-04 ENCOUNTER — APPOINTMENT (OUTPATIENT)
Dept: PHYSICAL THERAPY | Facility: REHABILITATION | Age: 42
End: 2022-05-04
Payer: COMMERCIAL

## 2022-05-06 ENCOUNTER — OFFICE VISIT (OUTPATIENT)
Dept: OBGYN CLINIC | Facility: CLINIC | Age: 42
End: 2022-05-06
Payer: COMMERCIAL

## 2022-05-06 VITALS — WEIGHT: 192 LBS | HEIGHT: 70 IN | BODY MASS INDEX: 27.49 KG/M2

## 2022-05-06 DIAGNOSIS — M25.562 ACUTE PAIN OF LEFT KNEE: Primary | ICD-10-CM

## 2022-05-06 PROCEDURE — 99213 OFFICE O/P EST LOW 20 MIN: CPT | Performed by: PHYSICAL MEDICINE & REHABILITATION

## 2022-05-06 NOTE — PROGRESS NOTES
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  He will be starting physical therapy this upcoming Monday  He should use his hinged knee brace for when he will be walking or standing for long periods of time  I will see him back in 3-4 weeks to reassess      Imaging Studies (I personally reviewed images in PACS and report):  Left knee x-rays most recent to this encounter reviewed  These images show no acute osseous abnormalities or severe degeneration      Left knee MRI:  "Tiny partial-thickness tear of the proximal medial collateral ligament /grade 2 sprain      Mild grade 2 sprain of the medial patellofemoral ligament      Small contusion anterolateral aspect of the lateral condyle   There is no corresponding contusion of the inferomedial patella to suggest recent lateral patellar dislocation relocation impaction injury      Mild degree of lateral patella tilt and translation with mild cartilage thinning along the lateral facet of the patella   Tiny fissuring of the central eminence of the patellar articular cartilage      Trace joint effusion and small Baker's cyst "    Return in about 3 weeks (around 5/27/2022)  Patient is in agreement with the above plan  HPI:  Leon Givens III is a 42 y o  male  who presents in follow up  Here for   Chief Complaint   Patient presents with    Left Knee - Follow-up, Pain       Since last visit: He stepped wrong and felt it go out again  He feels a little better  He has good days and bad days  Sometimes he wakes up with pain that improves with time  Following history reviewed and updated:  Past Medical History:   Diagnosis Date    Concussion     Pneumonia of right lower lobe due to infectious organism 6/15/2021    Sarcoidosis      Past Surgical History:   Procedure Laterality Date    BRONCHOSCOPY N/A 11/15/2016    Procedure: Brandon ePna;  Surgeon: Josh Pierosn MD;  [facility];  Service: ; DIAGNOSTIC; LAST ASSESSED: 12/13/16    EYE SURGERY      BIOPSY EYELID    FLEXIBLE BRONCHOSCOPY      (DIAGNOSTIC) RESOLVED: 11/15/16    [place] BRONCHOSCOPY NEEDLE BX TRACHEA MAIN STEM&/BRON N/A 11/15/2016    Procedure: EBUS;  Surgeon: Josh Pierson MD;  [facility];  Service: Thoracic; FOR PERIPH LESIONS; RESOLVED: 11/15/16     Social History   Social History     Substance and Sexual Activity   Alcohol Use Yes    Comment: several beers per week     Social History     Substance and Sexual Activity   Drug Use No     Social History     Tobacco Use   Smoking Status Never Smoker   Smokeless Tobacco Never Used     Family History   Problem Relation Age of Onset    No Known Problems Mother     Hypertension Father      No Known Allergies     Constitutional:  Ht 5' 10" (1 778 m)   Wt 87 1 kg (192 lb)   BMI 27 55 kg/m²    General: NAD  Eyes: Clear sclerae  ENT: No inflammation, lesion, or mass of lips  No tracheal deviation  Musculoskeletal: As mentioned below  Integumentary: No visible rashes or skin lesions  Pulmonary/Chest: Effort normal  No respiratory distress  Neuro: CN's grossly intact, SOMERS  Psych: Normal affect and judgement  Vascular: WWP  Left Knee Exam     Tenderness   The patient is experiencing tenderness in the medial retinaculum      Range of Motion   Extension: normal   Flexion: normal     Tests   Marlen:  Medial - negative Lateral - negative  Varus: negative Valgus: positive  Patellar apprehension: trace    Other   Erythema: absent  Scars: absent  Sensation: normal  Pulse: present    Comments:  Positive patellar grind test   Less apprehension with patellar movements               Procedures

## 2022-05-06 NOTE — LETTER
To Whom It May Concern,     Kezia Huang III is under my professional care    He was seen in my office on May 6,2022        He is cleared to return to work with the following restrictions:     · Desk duty only      Please excuse Kezia Huang III from any work missed      If you have any questions or concerns, please do not hesitate to call            Sincerely,            Teo Barr, DO

## 2022-05-13 ENCOUNTER — APPOINTMENT (OUTPATIENT)
Dept: PHYSICAL THERAPY | Facility: REHABILITATION | Age: 42
End: 2022-05-13
Payer: COMMERCIAL

## 2022-05-18 NOTE — PROGRESS NOTES
Daily Note     Today's date: 2022  Patient name: Sade Gonsalez III  : 1980  MRN: [id number]  Referring provider: Michelle Jeff  Dx:   Encounter Diagnosis     ICD-10-CM    1  Acute pain of left knee  M25 562    2  Sprain of medial collateral ligament of left knee, initial encounter  S83 412A    3  Effusion of left knee  M25 462        Start Time: 1143  Stop Time: 4248  Total time in clinic (min): 40 minutes    Subjective: No issues since initial evaluation  Objective: See treatment diary below    Assessment: Tolerated treatment well  Slight discomfort with single leg squat on airex and leg extension eccentrics however, this discomfort resolved with more repetitions  Patient exhibited good technique with therapeutic exercises and would benefit from continued PT    Plan: Continue per plan of care        Precautions: standard    Manuals                                                                Neuro Re-Ed            SLRs 15x HEP GTB 5x5 3lb           Leg extensions  2up 1dwn 5x5 20lbs           Squats with TB             Single leg flex/abd/ext 15x HEP GTB            Single leg squat  airex 4x5           Lateral band walk (ABD or ER)  ABD RHB 3 laps    ER 5 laps at bar YMB           SLS w/glute squeeze  10x15" airex           bosu lunge  2x10                                     HEP/education 13' HEP, MCL/MPFL            Ther Ex            Bike  8'           VG  5'                                                                                         Ther Activity                                       Gait Training                                       Modalities 19

## 2022-05-26 ENCOUNTER — OFFICE VISIT (OUTPATIENT)
Dept: OBGYN CLINIC | Facility: MEDICAL CENTER | Age: 42
End: 2022-05-26
Payer: COMMERCIAL

## 2022-05-26 VITALS
BODY MASS INDEX: 27.2 KG/M2 | DIASTOLIC BLOOD PRESSURE: 104 MMHG | HEART RATE: 109 BPM | SYSTOLIC BLOOD PRESSURE: 158 MMHG | HEIGHT: 70 IN | WEIGHT: 190 LBS

## 2022-05-26 DIAGNOSIS — M25.562 ACUTE PAIN OF LEFT KNEE: Primary | ICD-10-CM

## 2022-05-26 PROCEDURE — 99213 OFFICE O/P EST LOW 20 MIN: CPT | Performed by: PHYSICAL MEDICINE & REHABILITATION

## 2022-05-26 NOTE — PATIENT INSTRUCTIONS
Rules for limiting activity by pain symptoms:      -You can work through some pain, but keep it at a level from which you are distractible  Pain should not exceed 3/10 in severity    -Do not change your biomechanics / form with your activity  This can lead to further injury    -If you pay for your activity later with substantial symptom exacerbation, you are not yet ready for that level of exertion

## 2022-05-26 NOTE — LETTER
To Whom It May Concern,    Dell Nadiabrittany is under my professional care  He was seen in my office on May 26, 2022  He is cleared for all work starting 6/6/2022  Please excuse Shahid Freed III from work missed on this appointment date  If you have any questions or concerns, please do not hesitate to call          Sincerely,          Teo Pardo, DO

## 2022-05-26 NOTE — PROGRESS NOTES
1  Acute pain of left knee       No orders of the defined types were placed in this encounter  Impression:  Patient is here in follow up of left knee pain likely secondary to his MCL sprain, MPFL sprain and bone contusions with transient patellar subluxation   The patient's date of injury was around mid-late March  Patient has healed very well  He will continue with his home exercise program   We will have him return to work without restrictions this upcoming 6/6/2022  In the interim, he will return to work with his restrictions until then  He can slowly start incorporating all physical activity  He has been biking outside without any setbacks  I will see him back if needed        Imaging Studies (I personally reviewed images in PACS and report):  Left knee x-rays most recent to this encounter reviewed   These images show no acute osseous abnormalities or severe degeneration      Left knee MRI:  "Tiny partial-thickness tear of the proximal medial collateral ligament /grade 2 sprain      Mild grade 2 sprain of the medial patellofemoral ligament      Small contusion anterolateral aspect of the lateral condyle   There is no corresponding contusion of the inferomedial patella to suggest recent lateral patellar dislocation relocation impaction injury      Mild degree of lateral patella tilt and translation with mild cartilage thinning along the lateral facet of the patella   Tiny fissuring of the central eminence of the patellar articular cartilage      Trace joint effusion and small Baker's cyst "    Return if symptoms worsen or fail to improve  Patient is in agreement with the above plan  HPI:  Shahid Freed III is a 42 y o  male  who presents in follow up  Here for   Chief Complaint   Patient presents with    Left Knee - Follow-up, Pain       Since last visit: See above  He only gets pain with certain movements now  He has been working from home  His knee feels pretty good      Following history reviewed and updated:  Past Medical History:   Diagnosis Date    Concussion     Pneumonia of right lower lobe due to infectious organism 6/15/2021    Sarcoidosis      Past Surgical History:   Procedure Laterality Date    BRONCHOSCOPY N/A 11/15/2016    Procedure: Brandon Pena;  Surgeon: Josh Pierson MD;  [facility];  Service: ; DIAGNOSTIC; LAST ASSESSED: 12/13/16    EYE SURGERY      BIOPSY EYELID    FLEXIBLE BRONCHOSCOPY      (DIAGNOSTIC) RESOLVED: 11/15/16    [place] BRONCHOSCOPY NEEDLE BX TRACHEA MAIN STEM&/BRON N/A 11/15/2016    Procedure: EBUS;  Surgeon: Josh Pierson MD;  [facility];  Service: Thoracic; FOR PERIPH LESIONS; RESOLVED: 11/15/16     Social History   Social History     Substance and Sexual Activity   Alcohol Use Yes    Comment: several beers per week     Social History     Substance and Sexual Activity   Drug Use No     Social History     Tobacco Use   Smoking Status Never Smoker   Smokeless Tobacco Never Used     Family History   Problem Relation Age of Onset    No Known Problems Mother     Hypertension Father      No Known Allergies     Constitutional:  BP (!) 158/104   Pulse (!) 109   Ht 5' 10" (1 778 m)   Wt 86 2 kg (190 lb)   BMI 27 26 kg/m²    General: NAD  Eyes: Clear sclerae  ENT: No inflammation, lesion, or mass of lips  No tracheal deviation  Musculoskeletal: As mentioned below  Integumentary: No visible rashes or skin lesions  Pulmonary/Chest: Effort normal  No respiratory distress  Neuro: CN's grossly intact, SOMERS  Psych: Normal affect and judgement  Vascular: WWP  Left Knee Exam     Range of Motion   The patient has normal left knee ROM      Tests   Marlen:  Medial - negative Lateral - negative  Varus: negative Valgus: negative    Other   Erythema: absent  Scars: absent  Sensation: normal  Pulse: present  Swelling: none  Effusion: no effusion present    Comments:  Normal bounce home test              Procedures

## 2022-07-11 ENCOUNTER — APPOINTMENT (OUTPATIENT)
Dept: LAB | Facility: CLINIC | Age: 42
End: 2022-07-11

## 2022-07-11 DIAGNOSIS — Z00.8 ENCOUNTER FOR OTHER GENERAL EXAMINATION: ICD-10-CM

## 2022-07-11 LAB
CHOLEST SERPL-MCNC: 222 MG/DL
EST. AVERAGE GLUCOSE BLD GHB EST-MCNC: 108 MG/DL
HBA1C MFR BLD: 5.4 %
HDLC SERPL-MCNC: 86 MG/DL
LDLC SERPL CALC-MCNC: 107 MG/DL (ref 0–100)
NONHDLC SERPL-MCNC: 136 MG/DL
TRIGL SERPL-MCNC: 145 MG/DL

## 2022-07-11 PROCEDURE — 80061 LIPID PANEL: CPT

## 2022-07-11 PROCEDURE — 83036 HEMOGLOBIN GLYCOSYLATED A1C: CPT

## 2022-07-11 PROCEDURE — 36415 COLL VENOUS BLD VENIPUNCTURE: CPT

## 2022-11-19 ENCOUNTER — OFFICE VISIT (OUTPATIENT)
Dept: URGENT CARE | Age: 42
End: 2022-11-19

## 2022-11-19 ENCOUNTER — APPOINTMENT (OUTPATIENT)
Dept: RADIOLOGY | Age: 42
End: 2022-11-19

## 2022-11-19 VITALS
HEART RATE: 101 BPM | SYSTOLIC BLOOD PRESSURE: 100 MMHG | TEMPERATURE: 97.4 F | OXYGEN SATURATION: 99 % | RESPIRATION RATE: 12 BRPM | DIASTOLIC BLOOD PRESSURE: 77 MMHG

## 2022-11-19 DIAGNOSIS — R05.1 ACUTE COUGH: Primary | ICD-10-CM

## 2022-11-19 DIAGNOSIS — R05.1 ACUTE COUGH: ICD-10-CM

## 2022-11-19 RX ORDER — DEXTROMETHORPHAN HYDROBROMIDE AND PROMETHAZINE HYDROCHLORIDE 15; 6.25 MG/5ML; MG/5ML
5 SYRUP ORAL 4 TIMES DAILY PRN
Qty: 180 ML | Refills: 0 | Status: SHIPPED | OUTPATIENT
Start: 2022-11-19

## 2022-11-19 RX ORDER — AZITHROMYCIN 250 MG/1
TABLET, FILM COATED ORAL
Qty: 6 TABLET | Refills: 0 | Status: SHIPPED | OUTPATIENT
Start: 2022-11-19 | End: 2022-11-23

## 2022-11-19 NOTE — LETTER
November 19, 2022     Patient: Zion Her III   YOB: 1980   Date of Visit: 11/19/2022       To Whom it May Concern:    Debby Mcarthur was seen in my clinic on 11/19/2022  He  May return 11/22/2022  If you have any questions or concerns, please don't hesitate to call           Sincerely,          ADAN Hamilton        CC: No Recipients

## 2022-11-19 NOTE — PROGRESS NOTES
[facility]        NAME: Carmen Jacobson is a 36 y o  male  : 1980    MRN: [id number]  DATE: 2022  TIME: 6:38 PM    Assessment and Plan   Acute cough [R05 1]  1  Acute cough  XR chest pa & lateral    azithromycin (ZITHROMAX) 250 mg tablet    promethazine-dextromethorphan (PHENERGAN-DM) 6 25-15 mg/5 mL oral syrup      X-ray reviewed, no abnormality noted, awaiting official read  Will start azithromycin, Phenergan-DM per patient request as this has provided him relief in the past   Patient advised to continue Tylenol/ibuprofen as needed and to ensure adequate hydration  Follow-up with primary care provider if symptoms do not improve within 1-2 weeks  Patient Instructions    Acute Cough   WHAT YOU NEED TO KNOW:   An acute cough can last up to 3 weeks  Common causes of an acute cough include a cold, allergies, or a lung infection  DISCHARGE INSTRUCTIONS:   Return to the emergency department if:   • You have trouble breathing or feel short of breath      • You cough up blood, or you see blood in your mucus      • You faint or feel weak or dizzy      • You have chest pain when you cough or take a deep breath      • You have new wheezing      Contact your healthcare provider if:   • You have a fever      • Your cough lasts longer than 4 weeks      • Your symptoms do not improve with treatment      • You have questions or concerns about your condition or care      Medicines:   • Medicines  may be needed to stop the cough, decrease swelling in your airways, or help open your airways  Medicine may also be given to help you cough up mucus  Ask your healthcare provider what over-the-counter medicines you can take  If you have an infection caused by bacteria, you may need antibiotics      • Take your medicine as directed  Contact your healthcare provider if you think your medicine is not helping or if you have side effects  Tell him or her if you are allergic to any medicine   Keep a list of the medicines, vitamins, and herbs you take  Include the amounts, and when and why you take them  Bring the list or the pill bottles to follow-up visits  Carry your medicine list with you in case of an emergency      Manage your symptoms:   • Do not smoke and stay away from others who smoke  Nicotine and other chemicals in cigarettes and cigars can cause lung damage and make your cough worse  Ask your healthcare provider for information if you currently smoke and need help to quit  E-cigarettes or smokeless tobacco still contain nicotine  Talk to your healthcare provider before you use these products      • Drink extra liquids as directed  Liquids will help thin and loosen mucus so you can cough it up  Liquids will also help prevent dehydration  Examples of good liquids to drink include water, fruit juice, and broth  Do not drink liquids that contain caffeine  Caffeine can increase your risk for dehydration  Ask your healthcare provider how much liquid to drink each day      • Rest as directed  Do not do activities that make your cough worse, such as exercise      • Use a humidifier or vaporizer  Use a cool mist humidifier or a vaporizer to increase air moisture in your home  This may make it easier for you to breathe and help decrease your cough      • Eat 2 to 5 mL of honey 2 times each day  Honey can help thin mucus and decrease your cough      • Use cough drops or lozenges  These can help decrease throat irritation and your cough      Follow up with your healthcare provider as directed:  Write down your questions so you remember to ask them during your visits  © Copyright [employer] 2022 Information is for End User's use only and may not be sold, redistributed or otherwise used for commercial purposes  All illustrations and images included in CareNotes® are the copyrighted property of [employer]  or [employer]   The above information is an  only   It is not intended as medical advice for individual conditions or treatments  Talk to your doctor, nurse or pharmacist before following any medical regimen to see if it is safe and effective for you        Follow up with PCP in 3-5 days  Proceed to  ER if symptoms worsen  Chief Complaint     Chief Complaint   Patient presents with   • Cough   • Nasal Congestion   • Fever     chills   • Generalized Body Aches     Started Thursday; negative home covid yesterday; hx of pneumonia and sarcoidosis         History of Present Illness       Patient is a 28-year-old male with past medical history significant for sarcoidosis, pneumonia, who presents with wife for evaluation of cough, congestion, chills and body aches since yesterday  He performed an at home COVID test which was negative  Wife is a physician assistant, and reports that she listened to his lungs and felt that he was rhonchorous in his left lower lobe; patient also states that this feels similar to when he had pneumonia in the past   He denies nausea/vomiting/diarrhea, decreased fluid intake or urinary output, rash, headache, dizziness/lightheadedness, chest pain, palpitations or shortness of breath  He and wife are declining flu testing at this time, although he is not flu vaccinated  Review of Systems   Review of Systems   Constitutional: Positive for chills  Negative for fatigue and fever  HENT: Positive for congestion  Negative for ear discharge, ear pain, postnasal drip, rhinorrhea, sinus pressure, sinus pain, sneezing and sore throat  Eyes: Negative  Negative for pain, discharge, redness and itching  Respiratory: Positive for cough  Negative for apnea, choking, chest tightness, shortness of breath, wheezing and stridor  Cardiovascular: Negative  Negative for chest pain and palpitations  Gastrointestinal: Negative  Negative for diarrhea, nausea and vomiting  Endocrine: Negative  Negative for polydipsia, polyphagia and polyuria  Genitourinary: Negative  Negative for decreased urine volume and flank pain  Musculoskeletal: Positive for myalgias  Negative for arthralgias, back pain, neck pain and neck stiffness  Skin: Negative  Negative for color change and rash  Allergic/Immunologic: Negative  Negative for environmental allergies  Neurological: Negative  Negative for dizziness, facial asymmetry, light-headedness, numbness and headaches  Hematological: Negative  Negative for adenopathy  Psychiatric/Behavioral: Negative            Current Medications       Current Outpatient Medications:   •  azithromycin (ZITHROMAX) 250 mg tablet, Take 2 tablets today then 1 tablet daily x 4 days, Disp: 6 tablet, Rfl: 0  •  promethazine-dextromethorphan (PHENERGAN-DM) 6 25-15 mg/5 mL oral syrup, Take 5 mL by mouth 4 (four) times a day as needed for cough, Disp: 180 mL, Rfl: 0  •  amphetamine-dextroamphetamine (ADDERALL XR) 30 MG 24 hr capsule, Take 30 mg by mouth every morning, Disp: , Rfl:   •  amphetamine-dextroamphetamine (ADDERALL XR) 30 MG 24 hr capsule, every 24 hours (Patient not taking: Reported on 5/26/2022), Disp: , Rfl:     Current Allergies     Allergies as of 11/19/2022   • (No Known Allergies)            The following portions of the patient's history were reviewed and updated as appropriate: allergies, current medications, past family history, past medical history, past social history, past surgical history and problem list      Past Medical History:   Diagnosis Date   • Concussion    • Pneumonia of right lower lobe due to infectious organism 6/15/2021   • Sarcoidosis        Past Surgical History:   Procedure Laterality Date   • BRONCHOSCOPY N/A 11/15/2016    Procedure: Kayla Smith;  Surgeon: Mel Cuellar MD;  [facility];  Service: ; DIAGNOSTIC; LAST ASSESSED: 12/13/16   • EYE SURGERY      BIOPSY EYELID   • FLEXIBLE BRONCHOSCOPY      (DIAGNOSTIC) RESOLVED: 11/15/16   • [place] BRONCHOSCOPY NEEDLE BX TRACHEA MAIN STEM&/BRON N/A 11/15/2016 Procedure: EBUS;  Surgeon: Josafat Oakes MD;  [facility];  Service: Thoracic; FOR PERIPH LESIONS; RESOLVED: 11/15/16       Family History   Problem Relation Age of Onset   • No Known Problems Mother    • Hypertension Father          Medications have been verified  Objective   /77 (BP Location: Left arm, Patient Position: Sitting, Cuff Size: Large)   Pulse 101   Temp (!) 97 4 °F (36 3 °C) (Temporal)   Resp 12   SpO2 99%        Physical Exam     Physical Exam  Vitals reviewed  Constitutional:       General: He is not in acute distress  Appearance: Normal appearance  He is not ill-appearing, toxic-appearing or diaphoretic  Interventions: He is not intubated  HENT:      Head: Normocephalic and atraumatic  Right Ear: Tympanic membrane, ear canal and external ear normal  There is no impacted cerumen  Left Ear: Tympanic membrane, ear canal and external ear normal  There is no impacted cerumen  Nose: Nose normal  No congestion or rhinorrhea  Mouth/Throat:      Mouth: Mucous membranes are moist       Pharynx: Oropharynx is clear  Uvula midline  No pharyngeal swelling, oropharyngeal exudate, posterior oropharyngeal erythema or uvula swelling  Tonsils: No tonsillar exudate or tonsillar abscesses  1+ on the right  1+ on the left  Eyes:      Extraocular Movements: Extraocular movements intact  Conjunctiva/sclera: Conjunctivae normal       Pupils: Pupils are equal, round, and reactive to light  Cardiovascular:      Rate and Rhythm: Normal rate and regular rhythm  Pulses: Normal pulses  Heart sounds: Normal heart sounds, S1 normal and S2 normal  Heart sounds not distant  No murmur heard  No friction rub  No gallop  Pulmonary:      Effort: Pulmonary effort is normal  No tachypnea, bradypnea, accessory muscle usage, prolonged expiration, respiratory distress or retractions  He is not intubated  Breath sounds: Normal breath sounds   No stridor, decreased air movement or transmitted upper airway sounds  No decreased breath sounds, wheezing, rhonchi or rales  Chest:      Chest wall: No tenderness  Musculoskeletal:         General: Normal range of motion  Cervical back: Normal range of motion and neck supple  No rigidity or tenderness  Lymphadenopathy:      Cervical: No cervical adenopathy  Skin:     General: Skin is warm and dry  Capillary Refill: Capillary refill takes less than 2 seconds  Findings: No erythema  Neurological:      General: No focal deficit present  Mental Status: He is alert     Psychiatric:         Mood and Affect: Mood normal

## 2023-11-07 ENCOUNTER — APPOINTMENT (OUTPATIENT)
Dept: RADIOLOGY | Facility: AMBULARY SURGERY CENTER | Age: 43
End: 2023-11-07
Payer: COMMERCIAL

## 2023-11-07 ENCOUNTER — OFFICE VISIT (OUTPATIENT)
Dept: OBGYN CLINIC | Facility: CLINIC | Age: 43
End: 2023-11-07
Payer: COMMERCIAL

## 2023-11-07 VITALS
HEIGHT: 71 IN | SYSTOLIC BLOOD PRESSURE: 147 MMHG | DIASTOLIC BLOOD PRESSURE: 93 MMHG | WEIGHT: 200 LBS | HEART RATE: 75 BPM | BODY MASS INDEX: 28 KG/M2

## 2023-11-07 DIAGNOSIS — M76.821 TIBIALIS POSTERIOR TENDINITIS, RIGHT: Primary | ICD-10-CM

## 2023-11-07 DIAGNOSIS — M25.571 PAIN, JOINT, ANKLE AND FOOT, RIGHT: ICD-10-CM

## 2023-11-07 PROCEDURE — 73630 X-RAY EXAM OF FOOT: CPT

## 2023-11-07 PROCEDURE — 73610 X-RAY EXAM OF ANKLE: CPT

## 2023-11-07 PROCEDURE — 99214 OFFICE O/P EST MOD 30 MIN: CPT | Performed by: PHYSICAL MEDICINE & REHABILITATION

## 2023-11-07 RX ORDER — AMPHETAMINE 18.8 MG/1
18.8 TABLET, ORALLY DISINTEGRATING ORAL 3 TIMES DAILY
COMMUNITY
Start: 2023-08-10

## 2023-11-07 NOTE — PROGRESS NOTES
1. Tibialis posterior tendinitis, right        2. Pain, joint, ankle and foot, right  XR foot 3+ vw right    XR ankle 3+ vw right        Orders Placed This Encounter   Procedures    XR foot 3+ vw right    XR ankle 3+ vw right      Impression:  Right ankle pain likely secondary to navicular tuberosity avulsion injury and posterior tibialis tendinitis. The patient has had chronic pain for many years. He denies any recent injury. He has had multiple plantarflexion/inversion sprains with the last one being in the summer. He has good range of motion and good strength but is tender along his navicular tuberosity and posterior tibialis tendon. He was provided with a lace up brace to offload the tendon and to help with bony healing. I would like to see him back in 4 weeks to reassess. If still symptomatic, could consider advanced imaging. Imaging Studies (I personally reviewed images in PACS and report):  Right ankle and foot x-rays most recent to this encounter reviewed. These images show arthritic changes of the subtalar joint. Linear radiodensity just medial to the navicular likely represents an avulsion fracture of unknown chronicity (no recent injury as per patient). Prominent Stieda process. No follow-ups on file. Patient is in agreement with the above plan. HPI:  Nicko Stroud III is a 39 y.o. male  who presents for evaluation of   Chief Complaint   Patient presents with    Right Foot - Pain    Right Ankle - Pain       Onset/Mechanism: Chronic pain for many years due to multiple ankle sprains. Location: Medial ankle and above the foot. Radiation: As above. Provocative: Improves as the day goes on but worse at the end. Stiff in the morning. Severity: Not improving. Associated Symptoms: As above. Treatment so far: No recent treatment.     Following history reviewed and updated:  Past Medical History:   Diagnosis Date    Concussion     Pneumonia of right lower lobe due to infectious organism 6/15/2021    Sarcoidosis      Past Surgical History:   Procedure Laterality Date    BRONCHOSCOPY N/A 11/15/2016    Procedure: Emmanuel Alcon;  Surgeon: Sharmila Reyes MD;  [facility];  Service: ; DIAGNOSTIC; LAST ASSESSED: 12/13/16    EYE SURGERY      BIOPSY EYELID    FLEXIBLE BRONCHOSCOPY      (DIAGNOSTIC) RESOLVED: 11/15/16    LA BRONCHOSCOPY NEEDLE BX TRACHEA MAIN STEM&/BRON N/A 11/15/2016    Procedure: EBUS;  Surgeon: Sharmila Reyes MD;  [facility];  Service: Thoracic; FOR PERIPH LESIONS; RESOLVED: 11/15/16     Social History   Social History     Substance and Sexual Activity   Alcohol Use Yes    Comment: several beers per week     Social History     Substance and Sexual Activity   Drug Use No     Social History     Tobacco Use   Smoking Status Never   Smokeless Tobacco Never     Family History   Problem Relation Age of Onset    No Known Problems Mother     Hypertension Father      No Known Allergies     Constitutional:  /93   Pulse 75   Ht 5' 11" (1.803 m)   Wt 90.7 kg (200 lb)   BMI 27.89 kg/m²    General: NAD. Eyes: Anicteric sclerae. Neck: Supple. Lungs: Unlabored breathing. Cardiovascular: No lower extremity edema. Skin: Intact without erythema. Neurologic: Sensation intact to light touch. Psychiatric: Mood and affect are appropriate. Right Ankle Exam     Tenderness   Right ankle tenderness location: Navicular tuberosity and posterior tibialis tendon. Swelling: none    Range of Motion   The patient has normal right ankle ROM. Muscle Strength   The patient has normal right ankle strength. Other   Erythema: absent  Scars: absent  Sensation: normal  Pulse: present     Comments:  Normal Hopkin's and Kleiger's test.  No plantar bruising/swelling and no pain with midfoot compression. Sensory-motor testing is WNL. WWP.              Procedures

## 2023-11-07 NOTE — PATIENT INSTRUCTIONS
You can obtain diclofenac cream/gel/ointment over the counter. Many brands make this medication and they include Voltaren, Aspercreme and Aleve. You can use this up to 3 times per day. It is best to wash the area with water, pat dry and then apply. The cream absorbs better after this. Be careful not to let any pets or children get in contact with the medication as it can be harmful to them.     If you develop a skin reaction, stop using the cream.

## 2023-11-13 ENCOUNTER — NURSE TRIAGE (OUTPATIENT)
Age: 43
End: 2023-11-13

## 2023-11-13 ENCOUNTER — TELEPHONE (OUTPATIENT)
Age: 43
End: 2023-11-13

## 2023-11-13 NOTE — TELEPHONE ENCOUNTER
Caller: [email or web address]    Doctor: Es Coffman     Reason for call: Keyur Coleman called in regarding immediate find on the Patient xray -- Call hub was placed in     Call back#: NA

## 2023-11-13 NOTE — TELEPHONE ENCOUNTER
----- Message from Doc  sent at 11/13/2023 10:09 AM EST -----  Enedelia Bence called in regarding Immediate finding on the patient Xray foot. Megha Call back number is [phone]    ----- Message from Doc  sent at 11/13/2023 10:08 AM EST -----  Enedelia Bence called in regarding Immediate finding on the patient Xray foot.      Megha Call back number is [phone]

## 2024-05-16 ENCOUNTER — HOSPITAL ENCOUNTER (EMERGENCY)
Facility: HOSPITAL | Age: 44
Discharge: HOME | End: 2024-05-16
Attending: EMERGENCY MEDICINE | Admitting: EMERGENCY MEDICINE
Payer: COMMERCIAL

## 2024-05-16 VITALS
DIASTOLIC BLOOD PRESSURE: 105 MMHG | TEMPERATURE: 98.2 F | OXYGEN SATURATION: 100 % | HEART RATE: 101 BPM | RESPIRATION RATE: 16 BRPM | SYSTOLIC BLOOD PRESSURE: 168 MMHG

## 2024-05-16 DIAGNOSIS — S61.411A LACERATION OF RIGHT HAND WITHOUT FOREIGN BODY, INITIAL ENCOUNTER: Primary | ICD-10-CM

## 2024-05-16 DIAGNOSIS — L03.113 CELLULITIS OF HAND, RIGHT: ICD-10-CM

## 2024-05-16 PROCEDURE — 90471 IMMUNIZATION ADMIN: CPT

## 2024-05-16 PROCEDURE — 99284 EMERGENCY DEPT VISIT MOD MDM: CPT

## 2024-05-16 PROCEDURE — 90715 TDAP VACCINE 7 YRS/> IM: CPT | Performed by: EMERGENCY MEDICINE

## 2024-05-16 PROCEDURE — 99284 EMERGENCY DEPT VISIT MOD MDM: CPT | Performed by: EMERGENCY MEDICINE

## 2024-05-16 RX ORDER — CEPHALEXIN 500 MG/1
500 CAPSULE ORAL EVERY 8 HOURS SCHEDULED
Qty: 21 CAPSULE | Refills: 0 | Status: SHIPPED | OUTPATIENT
Start: 2024-05-16 | End: 2024-05-23

## 2024-05-16 RX ORDER — ACETAMINOPHEN 325 MG/1
975 TABLET ORAL ONCE
Status: COMPLETED | OUTPATIENT
Start: 2024-05-16 | End: 2024-05-16

## 2024-05-16 RX ORDER — CEPHALEXIN 250 MG/1
500 CAPSULE ORAL ONCE
Status: COMPLETED | OUTPATIENT
Start: 2024-05-16 | End: 2024-05-16

## 2024-05-16 RX ORDER — IBUPROFEN 600 MG/1
600 TABLET ORAL ONCE
Status: COMPLETED | OUTPATIENT
Start: 2024-05-16 | End: 2024-05-16

## 2024-05-16 RX ORDER — GINSENG 100 MG
1 CAPSULE ORAL ONCE
Status: COMPLETED | OUTPATIENT
Start: 2024-05-16 | End: 2024-05-16

## 2024-05-16 RX ADMIN — TETANUS TOXOID, REDUCED DIPHTHERIA TOXOID AND ACELLULAR PERTUSSIS VACCINE, ADSORBED 0.5 ML: 5; 2.5; 8; 8; 2.5 SUSPENSION INTRAMUSCULAR at 10:39

## 2024-05-16 RX ADMIN — ACETAMINOPHEN 975 MG: 325 TABLET, FILM COATED ORAL at 10:39

## 2024-05-16 RX ADMIN — CEPHALEXIN 500 MG: 250 CAPSULE ORAL at 10:39

## 2024-05-16 RX ADMIN — IBUPROFEN 600 MG: 600 TABLET, FILM COATED ORAL at 10:39

## 2024-05-16 RX ADMIN — BACITRACIN 1 SMALL APPLICATION: 500 OINTMENT TOPICAL at 10:39

## 2024-05-16 NOTE — ED PROVIDER NOTES
History  Chief Complaint   Patient presents with    Hand Laceration     Pt reports cutting right hand with knife over the weekend. Reports lac splitting open last night, reports throbbing pain. Last tetanus unknown. Bleeding controlled at this time.      43-year-old male presents 4 days after he cut his hand with a knife in the garage, he reports that his hand over the last day or so has begun swelling, bleeding again, and has increased pain.  He reports using bacitracin ointment on the cut without relief.  The patient is a  and is worried about his ability to work.  The patient reports he has not had any fever, nausea, vomiting, headache, dizziness, numbness and tingling in the extremity, no cough or shortness of breath or other issues.  Patient has no medical problems, takes Adderall for ADHD, has no allergies, no surgeries, drinks occasionally, does not smoke use drugs.        Prior to Admission Medications   Prescriptions Last Dose Informant Patient Reported? Taking?   Adzenys XR-ODT 18.8 MG TBED   Yes No   Sig: Take 18.8 mg by mouth 3 (three) times a day   Patient not taking: Reported on 11/7/2023   amphetamine-dextroamphetamine (ADDERALL XR) 30 MG 24 hr capsule  Self Yes No   Sig: Take 30 mg by mouth every morning   Patient not taking: Reported on 11/7/2023   amphetamine-dextroamphetamine (ADDERALL XR) 30 MG 24 hr capsule  Self Yes No   Sig: every 24 hours   Patient not taking: Reported on 5/26/2022   promethazine-dextromethorphan (PHENERGAN-DM) 6.25-15 mg/5 mL oral syrup   No No   Sig: Take 5 mL by mouth 4 (four) times a day as needed for cough   Patient not taking: Reported on 11/7/2023      Facility-Administered Medications: None       Past Medical History:   Diagnosis Date    Concussion     Pneumonia of right lower lobe due to infectious organism 6/15/2021    Sarcoidosis        Past Surgical History:   Procedure Laterality Date    BRONCHOSCOPY N/A 11/15/2016    Procedure: FLEX BRONCH;  Surgeon:  Karthikeyan Brumfield MD;  [facility];  Service: ; DIAGNOSTIC; LAST ASSESSED: 12/13/16    EYE SURGERY      BIOPSY EYELID    FLEXIBLE BRONCHOSCOPY      (DIAGNOSTIC) RESOLVED: 11/15/16    MN BRONCHOSCOPY NEEDLE BX TRACHEA MAIN STEM&/BRON N/A 11/15/2016    Procedure: EBUS;  Surgeon: Karthikeyan Brumfield MD;  [facility];  Service: Thoracic; FOR PERIPH LESIONS; RESOLVED: 11/15/16       Family History   Problem Relation Age of Onset    No Known Problems Mother     Hypertension Father      I have reviewed and agree with the history as documented.    E-Cigarette/Vaping    E-Cigarette Use Never User      E-Cigarette/Vaping Substances    Nicotine No     THC No     CBD No     Flavoring No     Other No     Unknown No      Social History     Tobacco Use    Smoking status: Never    Smokeless tobacco: Never   Vaping Use    Vaping status: Never Used   Substance Use Topics    Alcohol use: Yes     Comment: several beers per week    Drug use: No       Review of Systems   Constitutional:  Negative for fever.   HENT:  Negative for congestion.    Eyes:  Negative for visual disturbance.   Respiratory:  Negative for cough and shortness of breath.    Cardiovascular:  Negative for chest pain.   Gastrointestinal:  Negative for abdominal pain, diarrhea and vomiting.   Endocrine: Negative for polyuria.   Genitourinary:  Negative for dysuria and hematuria.   Musculoskeletal:  Negative for myalgias.   Skin:  Positive for wound.   Neurological:  Negative for dizziness and headaches.       Physical Exam  Physical Exam  Vitals and nursing note reviewed.   Constitutional:       General: He is not in acute distress.     Appearance: Normal appearance.   HENT:      Head: Normocephalic and atraumatic.      Right Ear: External ear normal.      Left Ear: External ear normal.      Mouth/Throat:      Mouth: Mucous membranes are moist.      Pharynx: Oropharynx is clear.   Eyes:      Conjunctiva/sclera: Conjunctivae normal.      Pupils: Pupils are equal,  round, and reactive to light.   Cardiovascular:      Rate and Rhythm: Normal rate and regular rhythm.      Heart sounds: Normal heart sounds.   Pulmonary:      Effort: Pulmonary effort is normal. No respiratory distress.      Breath sounds: Normal breath sounds.   Abdominal:      General: Abdomen is flat. There is no distension.      Palpations: Abdomen is soft.      Tenderness: There is no abdominal tenderness.   Musculoskeletal:         General: No deformity. Normal range of motion.      Cervical back: Normal range of motion.   Skin:     General: Skin is warm and dry.      Comments: 1.5 cm laceration on the dorsum of the MCP joint of the right hand, with surrounding erythema, no purulence or bleeding noted, there is some small swelling and tenderness to palpation.   Neurological:      General: No focal deficit present.      Mental Status: He is alert and oriented to person, place, and time.   Psychiatric:         Mood and Affect: Mood normal.         Behavior: Behavior normal.         Vital Signs  ED Triage Vitals [05/16/24 0955]   Temperature Pulse Respirations Blood Pressure SpO2   98.2 °F (36.8 °C) 101 16 (!) 168/105 100 %      Temp Source Heart Rate Source Patient Position - Orthostatic VS BP Location FiO2 (%)   Oral Monitor Sitting Right arm --      Pain Score       4           Vitals:    05/16/24 0955   BP: (!) 168/105   Pulse: 101   Patient Position - Orthostatic VS: Sitting         Visual Acuity      ED Medications  Medications   tetanus-diphtheria-acellular pertussis (BOOSTRIX) IM injection 0.5 mL (has no administration in time range)   bacitracin topical ointment 1 small application (has no administration in time range)   acetaminophen (TYLENOL) tablet 975 mg (has no administration in time range)   ibuprofen (MOTRIN) tablet 600 mg (has no administration in time range)   cephalexin (KEFLEX) capsule 500 mg (has no administration in time range)       Diagnostic Studies  Results Reviewed       None                    No orders to display              Procedures  Procedures         ED Course                                             Medical Decision Making  43-year-old male presents with a laceration 4 days ago, that is now painful, swollen, erythematous, and he reports bleeding again.  The patient does not have any systemic signs of illness or infection, but it appears that his hand has some cellulitis surrounding the laceration, he will be treated with a tetanus shot as he does not remember the last time he had his tetanus shot, antibiotics, wound to be dressed, it is too old to be sutured, and will be treated with regular cleanings, bacitracin ointment, and follow-up with his primary care doctor.  Return indications and follow-up instructions will be given, the patient is safe for discharge home.    Risk  OTC drugs.  Prescription drug management.             Disposition  Final diagnoses:   Laceration of right hand without foreign body, initial encounter   Cellulitis of hand, right     Time reflects when diagnosis was documented in both MDM as applicable and the Disposition within this note       Time User Action Codes Description Comment    5/16/2024 10:25 AM Anselmo Hyatt Add [S61.411A] Laceration of right hand without foreign body, initial encounter     5/16/2024 10:25 AM Anselmo Hyatt Add [L03.113] Cellulitis of hand, right           ED Disposition       ED Disposition   Discharge    Condition   Stable    Date/Time   Thu May 16, 2024 10:25 AM    Comment   Rashad BERNAL Rubin III discharge to home/self care.                   Follow-up Information       Follow up With Specialties Details Why Contact Info Additional Information    Rosi Nicole, DO Internal Medicine Schedule an appointment as soon as possible for a visit in 3 days For follow-up [address] [facility]  [address] Floor, [address]  [place] [address] [phone]       [facility] Emergency Department Emergency Medicine Go to  As needed  [address] [facility] [address]  [phone] Critical access hospital Emergency Department, [address], [place], [address]            Patient's Medications   Discharge Prescriptions    CEPHALEXIN (KEFLEX) 500 MG CAPSULE    Take 1 capsule (500 mg total) by mouth every 8 (eight) hours for 7 days       Start Date: 5/16/2024 End Date: 5/23/2024       Order Dose: 500 mg       Quantity: 21 capsule    Refills: 0       No discharge procedures on file.    PDMP Review         Value Time User    PDMP Reviewed  Yes 6/15/2021  1:06 PM Rosi Nicole DO            ED Provider  Electronically Signed by             Anselmo Hyatt MD  05/16/24 5117

## 2024-05-21 ENCOUNTER — OFFICE VISIT (OUTPATIENT)
Dept: INTERNAL MEDICINE CLINIC | Facility: CLINIC | Age: 44
End: 2024-05-21
Payer: COMMERCIAL

## 2024-05-21 VITALS
WEIGHT: 198 LBS | HEART RATE: 100 BPM | OXYGEN SATURATION: 99 % | TEMPERATURE: 97.4 F | BODY MASS INDEX: 27.72 KG/M2 | DIASTOLIC BLOOD PRESSURE: 104 MMHG | SYSTOLIC BLOOD PRESSURE: 164 MMHG | HEIGHT: 71 IN

## 2024-05-21 DIAGNOSIS — D86.9 SARCOIDOSIS: ICD-10-CM

## 2024-05-21 DIAGNOSIS — J06.9 UPPER RESPIRATORY TRACT INFECTION, UNSPECIFIED TYPE: ICD-10-CM

## 2024-05-21 DIAGNOSIS — S61.421D LACERATION OF RIGHT HAND WITH FOREIGN BODY, SUBSEQUENT ENCOUNTER: Primary | ICD-10-CM

## 2024-05-21 DIAGNOSIS — I10 BENIGN ESSENTIAL HYPERTENSION: ICD-10-CM

## 2024-05-21 PROBLEM — S61.421A LACERATION OF RIGHT HAND WITH FOREIGN BODY: Status: ACTIVE | Noted: 2024-05-21

## 2024-05-21 PROCEDURE — 99214 OFFICE O/P EST MOD 30 MIN: CPT | Performed by: STUDENT IN AN ORGANIZED HEALTH CARE EDUCATION/TRAINING PROGRAM

## 2024-05-21 RX ORDER — METHYLPREDNISOLONE 4 MG/1
TABLET ORAL
Qty: 21 EACH | Refills: 0 | Status: SHIPPED | OUTPATIENT
Start: 2024-05-21

## 2024-05-21 RX ORDER — BENZONATATE 200 MG/1
200 CAPSULE ORAL 3 TIMES DAILY PRN
Qty: 20 CAPSULE | Refills: 0 | Status: SHIPPED | OUTPATIENT
Start: 2024-05-21

## 2024-05-21 NOTE — ASSESSMENT & PLAN NOTE
S/p abx initiation and tetanus booster. Currently on day 5 of 7 day Keflex course.   Wound is clean, dry, and intact   Patient applying ointment and clean dressing

## 2024-05-21 NOTE — PATIENT INSTRUCTIONS
How to Take a Blood Pressure Reading   WHAT YOU NEED TO KNOW:   Blood pressure (BP) is the force of blood pushing on the walls of your arteries. Your BP results are written as 2 numbers. The first, or top, number is called systolic BP. This is the pressure caused by your heart pushing blood out to your body. The second, or bottom, number is called diastolic BP. This is the pressure when your heart relaxes and fills back up with blood. Ask your healthcare provider what your BP should be. For most people, a good BP goal is less than 120/80.       DISCHARGE INSTRUCTIONS:   Call your doctor if:   Your BP is higher or lower than you were told it should be.    You have questions or concerns about your condition or care.    Why you may need to take BP readings:  You may not have any signs or symptoms of high BP. You may need to take BP readings regularly to know how often your BP is high. High BP increases your risk for a stroke, heart attack, or kidney disease. You may need to take medicine to keep your BP at a normal level. Write down and keep a log of your BP readings. Your healthcare provider can use the results to see if your BP medicines are working.  How often to take your BP readings:  Your healthcare provider may recommend that you take your BP readings 2 times a day. Take the readings at the same times each day, such as the morning and evening.  How to take BP readings:  You can take BP readings at home with a digital BP monitor. Read the instructions that came with your BP monitor. The monitor comes with an adjustable cuff. Ask your healthcare provider if your cuff is the correct size.  Do not eat, drink, smoke, or exercise for 30 minutes before you take BP readings.    Rest quietly for 5 minutes before you begin. Do not talk while you take BP readings.    Sit with your feet flat on the floor and your back against a chair.    Put your arm straight out and support it on a flat surface. Your arm should be at chest  level. Do not move your arm while you take your BP readings.    Make sure all of the air is out of the cuff. Place the BP cuff against your bare skin about 1 inch (2.5 cm) above your elbow. Wrap the cuff snugly around your arm. The BP reading may not be correct if the cuff is too loose.    If you are using a wrist cuff, wrap the cuff snugly around your wrist. Hold your wrist at the same level as your heart.    Turn on the BP monitor and follow the directions.    Write down your BP, the date, the time, and which arm you used to take BP reading. Take 2 BP readings and write down both results. Use the same arm each time. These BP readings can be 1 minute apart.       What else you need to know:   Do not take a BP reading in an arm that is injured or has an IV or shunt.  The reading may not be accurate.    Do not stop taking your medicines if your BP is at your goal.  A BP at your goal means your medicine is working correctly. Take your BP medicines as directed.    Follow up with your doctor as directed:  Bring the log of your BP readings. Also bring the BP machine. Healthcare providers can check that you are using the machine correctly. Write down your questions so you remember to ask them during your visits.  © Copyright [employer] 2023 Information is for End User's use only and may not be sold, redistributed or otherwise used for commercial purposes.  The above information is an  only. It is not intended as medical advice for individual conditions or treatments. Talk to your doctor, nurse or pharmacist before following any medical regimen to see if it is safe and effective for you.

## 2024-05-21 NOTE — PROGRESS NOTES
Ambulatory Visit  Name: Rashad Rubin III      : 1980      MRN: [id number]  Encounter Provider: Kari Pavon MD  Encounter Date: 2024   Encounter department: [facility] INTERNAL MEDICINE    Assessment & Plan   1. Laceration of right hand with foreign body, subsequent encounter  Assessment & Plan:  S/p abx initiation and tetanus booster. Currently on day 5 of 7 day Keflex course.   Wound is clean, dry, and intact   Patient applying ointment and clean dressing  2. Upper respiratory tract infection, unspecified type  Assessment & Plan:  With severe cough and sob intermittently for several weeks  Hx of pneumonia and sarcoidosis  Will obtain chest xray  Medrol dose pack and tessalon perles  Once patient improves clinically have advised completion of PFTs for more thorough evaluation  Will consider abx therapy pending xray results    Orders:  -     XR chest 4 + vw; Future; Expected date: 2024  -     methylPREDNISolone 4 MG tablet therapy pack; Use as directed on package  -     benzonatate (TESSALON) 200 MG capsule; Take 1 capsule (200 mg total) by mouth 3 (three) times a day as needed for cough  3. Benign essential hypertension  Assessment & Plan:  BP Readings from Last 3 Encounters:   24 (!) 164/104   24 (!) 168/105   23 147/93     Have advised starting antihypertensive medication. Also gave option to check home BP for 2 weeks and RTC with results. Patient prefers latter option.   Discussed lifestyle modifications.   Additional info provided in AVS.  4. Sarcoidosis  Assessment & Plan:  Discussed following up with rheum  Patient will call for appointment         History of Present Illness     HPI    This is a pleasant 43 y.o. male who presents to the clinic for ED follow up. Patient was seen on 24 after suffering a laceration of his right hand with a knife. Per chart review, patient was treated with tetanus shot, abx and wound dressing.  Currently reports adequate  healing of hand. Denies any fever, edema, or worsening erythema at site of laceration.     Patient reports 4 day hx of cough and sob. Reports cough is productive of yellow-tinged mucus. Reports to having coughing fits in the morning with associated sob. Reports sob at rest. Reports similar presentation approximately 2 weeks prior (which was one week after he tested positive for COVID). Patient reports hx of sarcoid (dx 3 years ago) and pneumonia (1 year prior). Previously following with rheumatology, lost to follow up. Denies hx of asthma. Reports associated sore throat, congestion, and rhinorrhea. Denies fever, n/v/d.    Review of Systems   Constitutional:  Negative for chills and fever.   HENT:  Positive for congestion, rhinorrhea and sore throat. Negative for sinus pressure.    Respiratory:  Positive for shortness of breath. Negative for chest tightness and wheezing.    Cardiovascular:  Negative for chest pain and palpitations.   Gastrointestinal:  Negative for abdominal pain, nausea and vomiting.   Endocrine: Negative for polyuria.   Genitourinary:  Negative for difficulty urinating, dysuria, frequency and urgency.   Musculoskeletal:  Negative for myalgias.   Neurological:  Negative for dizziness, syncope and light-headedness.   Psychiatric/Behavioral:  Negative for dysphoric mood.      Past Medical History:   Diagnosis Date    Concussion     Pneumonia of right lower lobe due to infectious organism 6/15/2021    Sarcoidosis      Past Surgical History:   Procedure Laterality Date    BRONCHOSCOPY N/A 11/15/2016    Procedure: FLEX BRONCH;  Surgeon: Karthikeyan Brumfield MD;  [facility];  Service: ; DIAGNOSTIC; LAST ASSESSED: 12/13/16    EYE SURGERY      BIOPSY EYELID    FLEXIBLE BRONCHOSCOPY      (DIAGNOSTIC) RESOLVED: 11/15/16    DE BRONCHOSCOPY NEEDLE BX TRACHEA MAIN STEM&/BRON N/A 11/15/2016    Procedure: EBUS;  Surgeon: Karthikeyan Brumfield MD;  [facility];  Service: Thoracic; FOR PERIPH LESIONS; RESOLVED:  "11/15/16     Family History   Problem Relation Age of Onset    No Known Problems Mother     Hypertension Father      Social History     Tobacco Use    Smoking status: Never    Smokeless tobacco: Never   Vaping Use    Vaping status: Never Used   Substance and Sexual Activity    Alcohol use: Yes     Comment: several beers per week    Drug use: No    Sexual activity: Yes     Partners: Female     Current Outpatient Medications on File Prior to Visit   Medication Sig    cephalexin (KEFLEX) 500 mg capsule Take 1 capsule (500 mg total) by mouth every 8 (eight) hours for 7 days    Adzenys XR-ODT 18.8 MG TBED Take 18.8 mg by mouth 3 (three) times a day (Patient not taking: Reported on 11/7/2023)    amphetamine-dextroamphetamine (ADDERALL XR) 30 MG 24 hr capsule Take 30 mg by mouth every morning (Patient not taking: Reported on 11/7/2023)    amphetamine-dextroamphetamine (ADDERALL XR) 30 MG 24 hr capsule every 24 hours (Patient not taking: Reported on 5/26/2022)    promethazine-dextromethorphan (PHENERGAN-DM) 6.25-15 mg/5 mL oral syrup Take 5 mL by mouth 4 (four) times a day as needed for cough (Patient not taking: Reported on 11/7/2023)     No Known Allergies  Immunization History   Administered Date(s) Administered    COVID-19 MODERNA VACC 0.5 ML IM 03/30/2021, 04/27/2021, 12/20/2021    Hep A, adult 09/18/2018    Influenza, injectable, quadrivalent, preservative free 0.5 mL 11/14/2018    Tdap 11/14/2018, 05/16/2024    Tetanus, adsorbed 05/01/2007    Typhoid, Unspecified 09/18/2018    Typhoid, ViCPs 09/18/2018     Objective     BP (!) 164/104   Pulse 100   Temp (!) 97.4 °F (36.3 °C)   Ht 5' 11\" (1.803 m)   Wt 89.8 kg (198 lb)   SpO2 99%   BMI 27.62 kg/m²     Physical Exam  Vitals and nursing note reviewed.   Constitutional:       General: He is not in acute distress.     Appearance: He is well-developed.   HENT:      Head: Normocephalic and atraumatic.   Eyes:      Conjunctiva/sclera: Conjunctivae normal. "   Cardiovascular:      Rate and Rhythm: Normal rate and regular rhythm.      Heart sounds: Normal heart sounds. No murmur heard.  Pulmonary:      Effort: Pulmonary effort is normal. No respiratory distress.      Breath sounds: Normal breath sounds. No wheezing or rales.   Abdominal:      Palpations: Abdomen is soft.      Tenderness: There is no abdominal tenderness.   Musculoskeletal:         General: No swelling.      Cervical back: Neck supple.   Skin:     General: Skin is warm and dry.      Capillary Refill: Capillary refill takes less than 2 seconds.   Neurological:      Mental Status: He is alert.   Psychiatric:         Mood and Affect: Mood normal.       Administrative Statements

## 2024-05-21 NOTE — ASSESSMENT & PLAN NOTE
With severe cough and sob intermittently for several weeks  Hx of pneumonia and sarcoidosis  Will obtain chest xray  Medrol dose pack and tessalon perles  Once patient improves clinically have advised completion of PFTs for more thorough evaluation  Will consider abx therapy pending xray results

## 2024-05-21 NOTE — ASSESSMENT & PLAN NOTE
BP Readings from Last 3 Encounters:   05/21/24 (!) 164/104   05/16/24 (!) 168/105   11/07/23 147/93     Have advised starting antihypertensive medication. Also gave option to check home BP for 2 weeks and RTC with results. Patient prefers latter option.   Discussed lifestyle modifications.   Additional info provided in AVS.

## 2024-05-22 ENCOUNTER — HOSPITAL ENCOUNTER (OUTPATIENT)
Dept: RADIOLOGY | Facility: HOSPITAL | Age: 44
Discharge: HOME | End: 2024-05-22
Payer: COMMERCIAL

## 2024-05-22 DIAGNOSIS — J06.9 UPPER RESPIRATORY TRACT INFECTION, UNSPECIFIED TYPE: ICD-10-CM

## 2024-05-22 PROCEDURE — 71046 X-RAY EXAM CHEST 2 VIEWS: CPT

## 2024-05-23 ENCOUNTER — TELEPHONE (OUTPATIENT)
Age: 44
End: 2024-05-23

## 2024-05-23 DIAGNOSIS — J18.9 ATYPICAL PNEUMONIA: Primary | ICD-10-CM

## 2024-05-23 RX ORDER — AZITHROMYCIN 250 MG/1
TABLET, FILM COATED ORAL
Qty: 6 TABLET | Refills: 0 | Status: SHIPPED | OUTPATIENT
Start: 2024-05-23 | End: 2024-05-28

## 2024-05-23 NOTE — TELEPHONE ENCOUNTER
"Patient called in for x-ray results. Provider's comment shared:  \"Please let patient know xray showed findings consistent with his diagnosis of sarcoidosis. Cannot rule out infectious process. Will send script for abx to treat for atypical pneumonia in the event his symptoms are not improving or worsening.\"  Patient made aware of azithromycin (Zithromax) 250 mg tablet at his pharmacy.  He is reporting a mild productive cough-clear to yellow, slightly SOB at times with activity and fatigue. Denies fever. He is at work currently. He would like to know from PCP if it is okay for him to work his active job which requires him to be on his feet. Please advise.   "

## 2024-06-20 PROBLEM — S61.421A LACERATION OF RIGHT HAND WITH FOREIGN BODY: Status: RESOLVED | Noted: 2024-05-21 | Resolved: 2024-06-20

## 2024-08-21 ENCOUNTER — OFFICE VISIT (OUTPATIENT)
Dept: INTERNAL MEDICINE CLINIC | Facility: CLINIC | Age: 44
End: 2024-08-21
Payer: COMMERCIAL

## 2024-08-21 VITALS
SYSTOLIC BLOOD PRESSURE: 140 MMHG | HEART RATE: 88 BPM | HEIGHT: 70 IN | DIASTOLIC BLOOD PRESSURE: 82 MMHG | RESPIRATION RATE: 17 BRPM | BODY MASS INDEX: 28.49 KG/M2 | WEIGHT: 199 LBS | OXYGEN SATURATION: 98 %

## 2024-08-21 DIAGNOSIS — Z12.12 SCREENING FOR COLORECTAL CANCER: ICD-10-CM

## 2024-08-21 DIAGNOSIS — I10 BENIGN ESSENTIAL HYPERTENSION: ICD-10-CM

## 2024-08-21 DIAGNOSIS — S69.91XA INJURY OF RIGHT HAND, INITIAL ENCOUNTER: ICD-10-CM

## 2024-08-21 DIAGNOSIS — Z13.6 SCREENING FOR CARDIOVASCULAR CONDITION: ICD-10-CM

## 2024-08-21 DIAGNOSIS — Z00.00 ANNUAL PHYSICAL EXAM: ICD-10-CM

## 2024-08-21 DIAGNOSIS — Z12.11 SCREENING FOR COLORECTAL CANCER: ICD-10-CM

## 2024-08-21 DIAGNOSIS — D86.9 SARCOIDOSIS: ICD-10-CM

## 2024-08-21 DIAGNOSIS — E55.9 VITAMIN D DEFICIENCY: ICD-10-CM

## 2024-08-21 DIAGNOSIS — R93.89 ABNORMAL CHEST X-RAY: Primary | ICD-10-CM

## 2024-08-21 PROCEDURE — 99214 OFFICE O/P EST MOD 30 MIN: CPT | Performed by: INTERNAL MEDICINE

## 2024-08-21 PROCEDURE — 99396 PREV VISIT EST AGE 40-64: CPT | Performed by: INTERNAL MEDICINE

## 2024-08-21 NOTE — PROGRESS NOTES
Adult Annual Physical  Name: Rashad Rubin III      : 1980      MRN: [id number]  Encounter Provider: Rosi Nicole DO  Encounter Date: 2024   Encounter department: [facility] INTERNAL MEDICINE    Assessment & Plan   1. Abnormal chest x-ray  Comments:  -completed zpak for atypical PNA suspicion  -recheck CXR resolution  Orders:  -     XR chest pa & lateral; Future; Expected date: 2024  2. Benign essential hypertension  Assessment & Plan:  -elevated  -pt on adderall  -obtain labs and start monitoring home bp with log for review  -adhere to low sodium diet.  Orders:  -     CBC; Future  -     Comprehensive metabolic panel; Future  -     UA (URINE) with reflex to Scope; Future  -     TSH, 3rd generation with Free T4 reflex; Future  3. Sarcoidosis  -     Ambulatory Referral to Rheumatology; Future  4. Vitamin D deficiency  -     Vitamin D 25 hydroxy; Future  5. Injury of right hand, initial encounter  Assessment & Plan:  -R MCP laceration with DOI 24  -R MCP joint hypertrophy noted  -obtain hand xray  -up to date with tdap  Orders:  -     XR hand 2 vw right; Future; Expected date: 2024  6. Annual physical exam  7. Screening for colorectal cancer  -     Ambulatory referral to Gastroenterology; Future  8. Screening for cardiovascular condition  -     Lipid panel; Future; Expected date: 2024    Immunizations and preventive care screenings were discussed with patient today. Appropriate education was printed on patient's after visit summary.    Discussed risks and benefits of prostate cancer screening. We discussed the controversial history of PSA screening for prostate cancer in the [place] as well as the risk of over detection and over treatment of prostate cancer by way of PSA screening.  The patient understands that PSA blood testing is an imperfect way to screen for prostate cancer and that elevated PSA levels in the blood may also be caused by infection,  inflammation, prostatic trauma or manipulation, urological procedures, or by benign prostatic enlargement.    The role of the digital rectal examination in prostate cancer screening was also discussed and I discussed with him that there is large interobserver variability in the findings of digital rectal examination.    Counseling:  Alcohol/drug use: discussed moderation in alcohol intake, the recommendations for healthy alcohol use, and avoidance of illicit drug use.  Dental Health: discussed importance of regular tooth brushing, flossing, and dental visits.  Exercise: the importance of regular exercise/physical activity was discussed. Recommend exercise 3-5 times per week for at least 30 minutes.   Immunizations discussed.  Recommend yearly influenza vaccine, updated COVID vaccine and HPV series.  Cancer screenings discussed.  Will refer for colonoscopy.         History of Present Illness     Adult Annual Physical:  Patient presents for annual physical. 43yo male here for annual visit.    He has not monitored his home bp.     Diet and Physical Activity:  - Diet/Nutrition:. healthy diet  - Exercise:. rides bicycle 2-3x per week    Depression Screening:  - PHQ-2 Score: 0    General Health:  - Sleep:. gets 6-8 hours of sleep  - Vision: goes for regular eye exams and wears glasses and contacts.  - Dental: regular dental visits.     Health:    - Urinary symptoms: none.     Advanced Care Planning:  - Has an advanced directive?: no    - Has a durable medical POA?: no    - ACP document given to patient?: yes      Review of Systems   HENT:  Positive for sneezing. Negative for congestion, rhinorrhea and sore throat.    Respiratory:  Negative for cough, shortness of breath and wheezing.    Cardiovascular:  Negative for chest pain, palpitations and leg swelling.   Gastrointestinal:  Negative for abdominal pain, diarrhea, nausea and vomiting.   Genitourinary:  Negative for difficulty urinating.   Musculoskeletal:  Positive  "for arthralgias (R hand).   Neurological:  Negative for dizziness and headaches.   Psychiatric/Behavioral:  Negative for dysphoric mood and sleep disturbance. The patient is not nervous/anxious.      Medical History Reviewed by provider this encounter:  Tobacco  Allergies  Meds  Problems  Med Hx  Surg Hx  Fam Hx       Current Outpatient Medications on File Prior to Visit   Medication Sig Dispense Refill   • amphetamine-dextroamphetamine (ADDERALL XR) 30 MG 24 hr capsule every 24 hours     • [DISCONTINUED] Adzenys XR-ODT 18.8 MG TBED Take 18.8 mg by mouth 3 (three) times a day (Patient not taking: Reported on 11/7/2023)     • [DISCONTINUED] amphetamine-dextroamphetamine (ADDERALL XR) 30 MG 24 hr capsule Take 30 mg by mouth every morning (Patient not taking: Reported on 11/7/2023)     • [DISCONTINUED] benzonatate (TESSALON) 200 MG capsule Take 1 capsule (200 mg total) by mouth 3 (three) times a day as needed for cough (Patient not taking: Reported on 8/21/2024) 20 capsule 0   • [DISCONTINUED] methylPREDNISolone 4 MG tablet therapy pack Use as directed on package 21 each 0   • [DISCONTINUED] promethazine-dextromethorphan (PHENERGAN-DM) 6.25-15 mg/5 mL oral syrup Take 5 mL by mouth 4 (four) times a day as needed for cough (Patient not taking: Reported on 11/7/2023) 180 mL 0     No current facility-administered medications on file prior to visit.      Social History     Tobacco Use   • Smoking status: Never   • Smokeless tobacco: Never   Vaping Use   • Vaping status: Never Used   Substance and Sexual Activity   • Alcohol use: Yes     Comment: several beers per week   • Drug use: No   • Sexual activity: Yes     Partners: Female       Objective     /82 (BP Location: Left arm, Patient Position: Sitting, Cuff Size: Large)   Pulse 88   Resp 17   Ht 5' 10\" (1.778 m)   Wt 90.3 kg (199 lb)   SpO2 98%   BMI 28.55 kg/m²     Physical Exam  Vitals reviewed.   Constitutional:       General: He is not in acute " distress.  HENT:      Head: Normocephalic.      Right Ear: Tympanic membrane and ear canal normal.      Left Ear: Tympanic membrane and ear canal normal.      Nose: Nose normal.      Mouth/Throat:      Mouth: Mucous membranes are moist.   Eyes:      Extraocular Movements: Extraocular movements intact.      Conjunctiva/sclera: Conjunctivae normal.      Pupils: Pupils are equal, round, and reactive to light.   Neck:      Thyroid: No thyromegaly or thyroid tenderness.   Cardiovascular:      Rate and Rhythm: Normal rate and regular rhythm.      Pulses: Normal pulses.           Radial pulses are 2+ on the right side and 2+ on the left side.      Heart sounds: Normal heart sounds.   Pulmonary:      Effort: Pulmonary effort is normal. No respiratory distress.      Breath sounds: Normal breath sounds. No wheezing.   Abdominal:      General: Bowel sounds are normal. There is no distension.      Palpations: Abdomen is soft.      Tenderness: There is no abdominal tenderness.   Musculoskeletal:      Right hand: Deformity (R MCP jt hypertrophy) present. No bony tenderness. Normal range of motion. Normal strength. Normal pulse.      Left hand: Normal strength. Normal pulse.      Cervical back: Neck supple. No tenderness.      Right lower leg: No edema.      Left lower leg: No edema.   Lymphadenopathy:      Cervical: No cervical adenopathy.   Skin:     Coloration: Skin is not pale.   Neurological:      General: No focal deficit present.      Mental Status: He is alert and oriented to person, place, and time.   Psychiatric:         Mood and Affect: Mood normal.

## 2024-08-21 NOTE — ASSESSMENT & PLAN NOTE
-R MCP laceration with DOI 5/16/24  -R MCP joint hypertrophy noted  -obtain hand xray  -up to date with tdap

## 2024-08-21 NOTE — ASSESSMENT & PLAN NOTE
-elevated  -pt on adderall  -obtain labs and start monitoring home bp with log for review  -adhere to low sodium diet.

## 2024-08-21 NOTE — PATIENT INSTRUCTIONS
"Patient Education     Routine physical for adults   The Basics   Written by the doctors and editors at [facility]   What is a physical? -- A physical is a routine visit, or \"check-up,\" with your doctor. You might also hear it called a \"wellness visit\" or \"preventive visit.\"  During each visit, the doctor will:   Ask about your physical and mental health   Ask about your habits, behaviors, and lifestyle   Do an exam   Give you vaccines if needed   Talk to you about any medicines you take   Give advice about your health   Answer your questions  Getting regular check-ups is an important part of taking care of your health. It can help your doctor find and treat any problems you have. But it's also important for preventing health problems.  A routine physical is different from a \"sick visit.\" A sick visit is when you see a doctor because of a health concern or problem. Since physicals are scheduled ahead of time, you can think about what you want to ask the doctor.  How often should I get a physical? -- It depends on your age and health. In general, for people age 21 years and older:   If you are younger than 50 years, you might be able to get a physical every 3 years.   If you are 50 years or older, your doctor might recommend a physical every year.  If you have an ongoing health condition, like diabetes or high blood pressure, your doctor will probably want to see you more often.  What happens during a physical? -- In general, each visit will include:   Physical exam - The doctor or nurse will check your height, weight, heart rate, and blood pressure. They will also look at your eyes and ears. They will ask about how you are feeling and whether you have any symptoms that bother you.   Medicines - It's a good idea to bring a list of all the medicines you take to each doctor visit. Your doctor will talk to you about your medicines and answer any questions. Tell them if you are having any side effects that bother you. You " "should also tell them if you are having trouble paying for any of your medicines.   Habits and behaviors - This includes:   Your diet   Your exercise habits   Whether you smoke, drink alcohol, or use drugs   Whether you are sexually active   Whether you feel safe at home  Your doctor will talk to you about things you can do to improve your health and lower your risk of health problems. They will also offer help and support. For example, if you want to quit smoking, they can give you advice and might prescribe medicines. If you want to improve your diet or get more physical activity, they can help you with this, too.   Lab tests, if needed - The tests you get will depend on your age and situation. For example, your doctor might want to check your:   Cholesterol   Blood sugar   Iron level   Vaccines - The recommended vaccines will depend on your age, health, and what vaccines you already had. Vaccines are very important because they can prevent certain serious or deadly infections.   Discussion of screening - \"Screening\" means checking for diseases or other health problems before they cause symptoms. Your doctor can recommend screening based on your age, risk, and preferences. This might include tests to check for:   Cancer, such as breast, prostate, cervical, ovarian, colorectal, prostate, lung, or skin cancer   Sexually transmitted infections, such as chlamydia and gonorrhea   Mental health conditions like depression and anxiety  Your doctor will talk to you about the different types of screening tests. They can help you decide which screenings to have. They can also explain what the results might mean.   Answering questions - The physical is a good time to ask the doctor or nurse questions about your health. If needed, they can refer you to other doctors or specialists, too.  Adults older than 65 years often need other care, too. As you get older, your doctor will talk to you about:   How to prevent falling at " home   Hearing or vision tests   Memory testing   How to take your medicines safely   Making sure that you have the help and support you need at home  All topics are updated as new evidence becomes available and our peer review process is complete.  This topic retrieved from [employer] on: May 02, 2024.  Topic 018711 Version 1.0  Release: 32.4.3 - C32.122  © 2024 UpToDate, Inc[employer] and/or its affiliates. All rights reserved.  Consumer Information Use and Disclaimer   Disclaimer: This generalized information is a limited summary of diagnosis, treatment, and/or medication information. It is not meant to be comprehensive and should be used as a tool to help the user understand and/or assess potential diagnostic and treatment options. It does NOT include all information about conditions, treatments, medications, side effects, or risks that may apply to a specific patient. It is not intended to be medical advice or a substitute for the medical advice, diagnosis, or treatment of a health care provider based on the health care provider's examination and assessment of a patient's specific and unique circumstances. Patients must speak with a health care provider for complete information about their health, medical questions, and treatment options, including any risks or benefits regarding use of medications. This information does not endorse any treatments or medications as safe, effective, or approved for treating a specific patient. Up[employer], Inc[employer] and its affiliates disclaim any warranty or liability relating to this information or the use thereof.The use of this information is governed by the Terms of Use, available at https://www.[employer]uwer.com/en/know/clinical-effectiveness-terms. 2024© UpToDate, Inc. and its affiliates and/or licensors. All rights reserved.  Copyright   © 2024 Up[employer]Date, Inc. and/or its affiliates. All rights reserved.

## 2024-10-01 ENCOUNTER — PREP FOR PROCEDURE (OUTPATIENT)
Age: 44
End: 2024-10-01

## 2024-10-01 ENCOUNTER — TELEPHONE (OUTPATIENT)
Age: 44
End: 2024-10-01

## 2024-10-01 DIAGNOSIS — Z12.11 SCREENING FOR COLON CANCER: Primary | ICD-10-CM

## 2024-10-01 NOTE — LETTER
Hello,    Attached are your prep instructions for your upcoming procedure on 01/06/2025. If you have any questions, please give us a call at [phone].    Thank you,    [facility]        Medicine Instructions for Adults with Diabetes who Need a Bowel Prep       Follow these instructions when a BOWEL PREP is required for your procedure or surgery!    NOTE:   GLP-1 Agonists taken weekly: do not take in the 7 days before your procedure   SGLT-2 Inhibitors: do not take in the 4 days before your procedure     On the Day Before Surgery/Procedure  If you are having a procedure (e.g. Colonoscopy) or surgery that requires a bowel prep and you may have at least a clear liquid diet, follow the directions below based on the type of medicine you take for your diabetes.     Type of Medicine You Take Examples What to do   Pre-Mixed Insulin - Intermediate Acting Humalog® 75/25, Humulin® 70/30, Novolog® 70/30, Regular Insulin Take ½ your regular dose the evening before your procedure   Rapid/Fast Acting Insulin Humalog® U200, NovoLog®, Apidra®, Fiasp® Take ½ your regular dose the evening before your procedure.   Long-Acting Insulin Lantus®, Levemir®, Tresiba®, Toujeo®, Basaglar® Take your FULL regular dose the day before procedure   Oral Sulfonylurea Glipizide/Glimepiride/Glucotrol® Take ½ your regular dose the evening before your procedure   Other Oral Diabetes Medicines Metformin®, Glucophage®, Glucophage XR®, Riomet®, Glumetza®), Actose®, Avandia®, Glyset®, Prandin® Take your regular dose with dinner in the evening before your procedure   GLP-1 Agonists AdlyxinÒ, ByettaÒ, BydureonÒ, OzempicÒ, SoliquaÒ, TanzeumÒ, TrulicityÒ, VictozaÒ, Saxenda®, Rybelsus® If taken daily, take as normal    If taken weekly, do not take this medicine for 7 days before your procedure including the day of the procedure (resume taking after the procedure)   SGLT-2 Inhibitors Jardiance®, Invokana®, Farxiga®,    Steglatro®, Brenzavvy®, Qtern®, Segluromet®, Glyxambi®, Synjardy®, Synjardy XR®, Invokamet®, Invokamet XR®, Trijary XR®, Xigduo XR®, Steglujan® Do not take for 4 days before your procedure including the day of the procedure (resume taking after the procedure)                More information continued on back                    Medicine Instructions for Adults with Diabetes who Need a Bowel Prep  Page 2      On the Day of Surgery/Procedure  Follow the directions below based on the type of medicine you take for your diabetes.     Type of Medicine You Take Examples What to do   Long-Acting Insulin Lantus®, Levemir®, Tresiba®, Toujeo®, Basaglar®, Semglee®   If you usually take your Long-Acting Insulin in the morning, take the full dose as scheduled.   GLP-1 Agonists AdlyxinÒ, ByettaÒ, BydureonÒ, OzempicÒ, SoliquaÒ, TanzeumÒ, TrulicityÒ, VictozaÒ, Saxenda®, Rybelsus® Do NOT take this medicine on the day of your procedure (resume taking after the procedure)       On the Day of Surgery/Procedure (continued)  Except for the morning Long-Acting Insulin, DO NOT take ANY diabetic medicine on the day of your procedure unless you were instructed by the doctor who manages your diabetes medicines.    Continue to check your blood sugars.  If you have an insulin pump, ask your endocrinologist for instructions at least 3 days before your procedure. NOTE: If you are not able to ask your endocrinologist in advance, on the day of the procedure set your insulin pump to your basal rate only. Bring your insulin pump supplies to the hospital.     If you have any questions about taking your diabetes medicines prior to your procedure, please contact the doctor who manages your diabetes medicines.    COLONOSCOPY  MIRALAX/Dulcolax Bowel Preparation Instructions    The OR/GI Lab will contact you the evening prior to your procedure with your exact arrival time.    Our practice requires a 1 week notice for any cancellations or rescheduling. We  kindly ask that you immediately notify us of any changes including any new medications that are prescribed. Thank you for your cooperation.     WEEK BEFORE YOUR PROCEDURE:  Stop taking Iron tablets.  5 days prior, AVOID vegetables and fruits with skins or seeds, nuts, corn, popcorn and whole grain breads.   Purchase: One (1) 238-gram container of Miralax (polyethylene glycol 3350), four (4) 5 mg Dulcolax (bisacodyl) tablets, and one (1) 64-ounce bottle of Gatorade (sports drink) - no red, orange, or purple. These may be purchased at any pharmacy without a prescription. Generic products are permissible.   Arrange responsible transportation for day of the procedure.     DAY BEFORE THE PROCEDURE:   CLEAR liquids only for entire day prior. Nothing red, orange or purple.    You MAY have:                                                               Soda  Water  Broth Gatorade  Jello  Popsicles Coffee/tea without milk/creamer     YOU MAY NOT HAVE:  Solid foods   Milk and milk products    Juice with pulp    BOWEL PREPARATION:  Includes: One (1) 238-gram container of Miralax (polyethylene glycol 3350), four (4) 5 mg Dulcolax (bisacodyl) tablets, and one (1) 64-ounce bottle of Gatorade (sports drink).  Preparation may be refrigerated.  Entire bowel prep should be completed.     Afternoon before the procedure (2:00 pm - 5:00 pm):    Take two (2) 5 mg Dulcolax laxative tablets.     Evening before the procedure (6:00 pm):  Mix entire container of Miralax with one (1) 64-ounce bottle of Gatorade and shake until all medication is dissolved.   Begin drinking solution. Drink an eight (8) ounce cup every 10-15 minutes until you have consumed half (32 ounces) of the solution.  Refrigerate remaining solution.    Night before the procedure (8:00 pm):  Take two (2) 5 mg Dulcolax laxative tablets.     Beginning 5 hours before your procedure:  Drink the remaining amount of prepared solution (32 ounces).  Drink an eight (8) ounce cup every  10-15 minutes until you have consumed the remaining solution.     Bowel prep should be completed 4 hours prior to procedure time.    NOTHING TO EAT OR DRINK AFTER MIDNIGHT- EXCEPT FOR YOUR PREP    DAY OF THE PROCEDURE:  You may brush your teeth.  Leave all jewelry at home.  Please arrive for your procedure as indicated by the OR / GI Lab / Endoscopy Unit. The hospital will contact you the day before with your exact arrival time.   Make sure you have arranged ahead of time for a responsible adult (18 or older) to accompany and drive you home after the procedure.  Please discuss any transportation concerns with our staff prior to your procedure.    The effects of the anesthesia can persist for 24 hours.  After receiving the sedation, you must exercise caution before engaging in any activity that could harm yourself and others (such as driving a car).  Do not make any important decisions or do not drink any alcoholic beverages during this time period.  After your procedure, you may have anything you'd like to eat or drink.  You will probably want to start with something light.  Please include plenty of fluids.  Avoid items that cause gas such as sodas and salads.    SPECIAL INSTRUCTIONS:    For patients currently taking blood thinners and/or antiplatelet therapy our office will contact the prescribing provider.  Our office will contact you with any required changes to your medication regimen.     Blood thinner (i.e. - Coumadin, Pradaxa, Lovenox, Xarelto, Eliquis)  ?  Continue (Do Not Stop)  ? Stop______________for_____________days prior to the procedure.    Antiplatelet (i.e. - Plavix, Aggrenox, Effient, Brilinta)  ?  Continue (Do Not Stop)  ? Stop______________for_____________days prior to the procedure.       Diabetes:   If you are Diabetic, please see separate Diabetic Instruction Sheet.          Prescribed medications:  Do not stop your aspirin, or any of your other medications (unless instructed  otherwise).    Take the rest of your prescribed medications with small sips of water at least 2 hours prior to your procedure.      For any questions or concerns related to your bowel preparation or pre-procedure instructions, please contact our office at [phone].  Thank you for choosing [facility]!    COLONOSCOPY  MIRALAX/Dulcolax Bowel Preparation Instructions    The OR/GI Lab will contact you the evening prior to your procedure with your exact arrival time.    Our practice requires a 1 week notice for any cancellations or rescheduling. We kindly ask that you immediately notify us of any changes including any new medications that are prescribed. Thank you for your cooperation.     WEEK BEFORE YOUR PROCEDURE:  Stop taking Iron tablets.  5 days prior, AVOID vegetables and fruits with skins or seeds, nuts, corn, popcorn and whole grain breads.   Purchase: One (1) 238-gram container of Miralax (polyethylene glycol 3350), four (4) 5 mg Dulcolax (bisacodyl) tablets, and one (1) 64-ounce bottle of Gatorade (sports drink) - no red, orange, or purple. These may be purchased at any pharmacy without a prescription. Generic products are permissible.   Arrange responsible transportation for day of the procedure.     DAY BEFORE THE PROCEDURE:   CLEAR liquids only for entire day prior. Nothing red, orange or purple.    You MAY have:                                                               Soda  Water  Broth Gatorade  Jello  Popsicles Coffee/tea without milk/creamer     YOU MAY NOT HAVE:  Solid foods   Milk and milk products    Juice with pulp    BOWEL PREPARATION:  Includes: One (1) 238-gram container of Miralax (polyethylene glycol 3350), four (4) 5 mg Dulcolax (bisacodyl) tablets, and one (1) 64-ounce bottle of Gatorade (sports drink).  Preparation may be refrigerated.  Entire bowel prep should be completed.     Afternoon before the procedure (2:00 pm - 5:00 pm):    Take two (2) 5 mg Dulcolax  laxative tablets.     Evening before the procedure (6:00 pm):  Mix entire container of Miralax with one (1) 64-ounce bottle of Gatorade and shake until all medication is dissolved.   Begin drinking solution. Drink an eight (8) ounce cup every 10-15 minutes until you have consumed half (32 ounces) of the solution.  Refrigerate remaining solution.    Night before the procedure (8:00 pm):  Take two (2) 5 mg Dulcolax laxative tablets.     Beginning 5 hours before your procedure:  Drink the remaining amount of prepared solution (32 ounces).  Drink an eight (8) ounce cup every 10-15 minutes until you have consumed the remaining solution.     Bowel prep should be completed 4 hours prior to procedure time.    NOTHING TO EAT OR DRINK AFTER MIDNIGHT- EXCEPT FOR YOUR PREP    DAY OF THE PROCEDURE:  You may brush your teeth.  Leave all jewelry at home.  Please arrive for your procedure as indicated by the OR / GI Lab / Endoscopy Unit. The hospital will contact you the day before with your exact arrival time.   Make sure you have arranged ahead of time for a responsible adult (18 or older) to accompany and drive you home after the procedure.  Please discuss any transportation concerns with our staff prior to your procedure.    The effects of the anesthesia can persist for 24 hours.  After receiving the sedation, you must exercise caution before engaging in any activity that could harm yourself and others (such as driving a car).  Do not make any important decisions or do not drink any alcoholic beverages during this time period.  After your procedure, you may have anything you'd like to eat or drink.  You will probably want to start with something light.  Please include plenty of fluids.  Avoid items that cause gas such as sodas and salads.    SPECIAL INSTRUCTIONS:    For patients currently taking blood thinners and/or antiplatelet therapy our office will contact the prescribing provider.  Our office will contact you with any  required changes to your medication regimen.     Blood thinner (i.e. - Coumadin, Pradaxa, Lovenox, Xarelto, Eliquis)  ?  Continue (Do Not Stop)  ? Stop______________for_____________days prior to the procedure.    Antiplatelet (i.e. - Plavix, Aggrenox, Effient, Brilinta)  ?  Continue (Do Not Stop)  ? Stop______________for_____________days prior to the procedure.       Diabetes:   If you are Diabetic, please see separate Diabetic Instruction Sheet.          Prescribed medications:  Do not stop your aspirin, or any of your other medications (unless instructed otherwise).    Take the rest of your prescribed medications with small sips of water at least 2 hours prior to your procedure.      For any questions or concerns related to your bowel preparation or pre-procedure instructions, please contact our office at [phone].  Thank you for choosing [facility]!

## 2024-10-01 NOTE — TELEPHONE ENCOUNTER
10/01/24  Screened by: Estefania Wright    Referring Provider     [employer] [facility]         Pre- Screening: Z12.11, Z12.12 (ICD-10-CM) - Screening for colorectal cancer     There is no height or weight on file to calculate BMI.28.55  Has patient been referred for a routine screening Colonoscopy? yes  Is the patient between 45-75 years old? yes      Previous Colonoscopy no   If yes:    Date:     Facility:     Reason:       Does the patient want to see a Gastroenterologist prior to their procedure OR are they having any GI symptoms? no    Has the patient been hospitalized or had abdominal surgery in the past 6 months? no    Does the patient use supplemental oxygen? no    Does the patient take Coumadin, Lovenox, Plavix, Elliquis, Xarelto, or other blood thinning medication? no    Has the patient had a stroke, cardiac event, or stent placed in the past year? no    If patient is between 45yrs - 49yrs, please advise patient that we will have to confirm benefits & coverage with their insurance company for a routine screening colonoscopy.

## 2024-10-01 NOTE — TELEPHONE ENCOUNTER
Scheduled date of colonoscopy (as of today)1/6/2025  Physician performing colonoscopy:Dr. Carbajal  Location of colonoscopy:[facility]  Bowel prep reviewed with patient:Diego/Dul  Instructions reviewed with patient by:JENISE- Diego/Dul prep instructions sent to [facility]  Clearances: n/a

## 2024-11-01 NOTE — PROGRESS NOTES
"Rheumatology Initial Outpatient Visit  Name: Rashad Rubin III      : 1980      MRN: [id number]  Encounter Provider: Yulia Molina MD  Encounter Date: 2024   Encounter department: [facility]    Assessment & Plan  Injury of right hand, initial encounter  44-year-old female who presents for further evaluation of right second MCP swelling which occurred after a laceration to the second MCP.  He otherwise is well without any significant complaints. Exam reveals more bony hypertrophy without synovitis.  Although patient has had a prior history of sarcoidosis, low suspicion that the isolated second MCP swelling would be related to recurrence of sarcoidosis. I recommend a x-ray of the hand to evaluate for bony pathology as well as an ultrasound of the right second MCP to evaluate for any evidence of cyst or foreign body.  Of note, earlier this year in May he also had cough with chest x-ray showing bilateral lower interstitial and filtrates concerning for possible pneumonia versus interstitial fibrosis there was no findings of lymphadenopathy. Currently he has having no respiratory symptoms. Follow-up pending imaging studies.  Orders:    XR hand 2 vw right    XR hand 3+ vw right; Future    US MSK limited; Future    Sarcoidosis  Remains in remission.  Orders:    Ambulatory Referral to Rheumatology      History of Present Illness     Rashad Rubin III is a 44 y.o. male who presents for swelling of the right second MCP.  Patient reports back in May he was working at his house when he suffered a laceration to the right second MCP.  He was also planting in his garden around this time and had his hands in soil.  There was a concern for infection so he did complete a course of oral antibiotics.  Patient reports since then the area has been more swollen with a \"bump\" on the knuckle.  It is not causing any pain.  It has not increased in size.  He has not noticed any other bumps popping up " elsewhere.    Patient has a prior diagnosis of sarcoidosis.  Developed eye swelling in 2016 with nodules on eyelid. The nodule was biopsied and showed granulomas. CT chest done after this showing mediastinal and hilar lymphadenopathy. He underwent bronch with biopsy of LN showing non-caseating granulomas. He was started on steroids and eventually MTX for sarcoidosis.  Symptoms eventually resolved and he has remained in remission for several years.      Review of Systems  Complete ROS conducted as per HPI. In addition, denies:  Fever  Photosensitive rash  Sicca symptoms  Recurrent oral ulcers  Muscle weakness  Uveitis  Dactylitis  Chest pain  SOB  Pleurisy  Gross hematuria  Foamy urine  Raynaud's  Joint issues other than noted above      Objective     There were no vitals taken for this visit.    Physical Exam  Physical Exam  Constitutional: well appearing, no acute distress  HEENT: normocephalic, sclera clear, no visible oral or nasal ulcers  Neck: supple, no palpable cervical adenopathy  CV: regular rate and rhythm, no murmur  Pulm: normal respiratory effort, lungs clear to auscultation b/l  Skin: no rashes, no skin thickening  Extremities: warm and well perfused, no edema  MSK:  - Observation: Fullness of right second MCP  - Palpation: No tenderness to palpation, no clear mobile mass under the skin  - Synovitis: Absent  - Effusion: Absent  - ROM: Normal    Labs and Imaging  I have personally reviewed pertinent labs and imaging.     CXR 5/2024:  Worsening bilateral lower zone interstitial infiltrates as above with relative sparing of the upper lobes.  Findings are suspicious for onset of interstitial fibrosis.  Inflammatory process such as an atypical or viral pneumonia or environmental reactive process is another possibility.  Correlate with spirometry or perhaps CT   No airspace infiltrate or effusion

## 2024-11-04 ENCOUNTER — CONSULT (OUTPATIENT)
Age: 44
End: 2024-11-04
Payer: COMMERCIAL

## 2024-11-04 VITALS
HEART RATE: 90 BPM | TEMPERATURE: 96.7 F | WEIGHT: 197 LBS | DIASTOLIC BLOOD PRESSURE: 86 MMHG | BODY MASS INDEX: 28.2 KG/M2 | HEIGHT: 70 IN | SYSTOLIC BLOOD PRESSURE: 154 MMHG | OXYGEN SATURATION: 99 %

## 2024-11-04 DIAGNOSIS — S69.91XA INJURY OF RIGHT HAND, INITIAL ENCOUNTER: ICD-10-CM

## 2024-11-04 DIAGNOSIS — D86.9 SARCOIDOSIS: ICD-10-CM

## 2024-11-04 PROCEDURE — 99204 OFFICE O/P NEW MOD 45 MIN: CPT | Performed by: STUDENT IN AN ORGANIZED HEALTH CARE EDUCATION/TRAINING PROGRAM

## 2024-11-04 RX ORDER — AMPHETAMINE 18.8 MG/1
TABLET, ORALLY DISINTEGRATING ORAL
COMMUNITY
Start: 2024-10-19

## 2024-11-04 NOTE — ASSESSMENT & PLAN NOTE
44-year-old female who presents for further evaluation of right second MCP swelling which occurred after a laceration to the second MCP.  He otherwise is well without any significant complaints. Exam reveals more bony hypertrophy without synovitis.  Although patient has had a prior history of sarcoidosis, low suspicion that the isolated second MCP swelling would be related to recurrence of sarcoidosis. I recommend a x-ray of the hand to evaluate for bony pathology as well as an ultrasound of the right second MCP to evaluate for any evidence of cyst or foreign body.  Of note, earlier this year in May he also had cough with chest x-ray showing bilateral lower interstitial and filtrates concerning for possible pneumonia versus interstitial fibrosis there was no findings of lymphadenopathy. Currently he has having no respiratory symptoms. Follow-up pending imaging studies.  Orders:    XR hand 2 vw right    XR hand 3+ vw right; Future    US MSK limited; Future

## 2024-12-23 ENCOUNTER — ANESTHESIA (OUTPATIENT)
Dept: ANESTHESIOLOGY | Facility: HOSPITAL | Age: 44
End: 2024-12-23

## 2024-12-23 ENCOUNTER — ANESTHESIA EVENT (OUTPATIENT)
Dept: ANESTHESIOLOGY | Facility: HOSPITAL | Age: 44
End: 2024-12-23

## 2025-01-04 ENCOUNTER — TELEPHONE (OUTPATIENT)
Dept: OTHER | Facility: OTHER | Age: 45
End: 2025-01-04

## 2025-01-05 NOTE — TELEPHONE ENCOUNTER
Patient is calling regarding cancelling an appointment.    Date/Time: 1/6/2025 11:00    Patient was rescheduled: YES [] NO [x]    Patient requesting call back to reschedule: YES [x] NO []    Paged on call via [facility]

## 2025-01-14 ENCOUNTER — TELEPHONE (OUTPATIENT)
Dept: GASTROENTEROLOGY | Facility: CLINIC | Age: 45
End: 2025-01-14

## 2025-01-21 NOTE — TELEPHONE ENCOUNTER
Patient calling to reschedule his colonoscopy. Colonoscopy has been rescheduled for 8/4/25 with   at [facility]. Patient has prep instructions. OA updated       01/21/25  Screened by: Ena Salinas MA      Does the patient want to see a Gastroenterologist prior to their procedure OR are they having any GI symptoms? no    Has the patient been hospitalized or had abdominal surgery in the past 6 months? no    Does the patient use supplemental oxygen? no    Does the patient take Coumadin, Lovenox, Plavix, Elliquis, Xarelto, or other blood thinning medication? no    Has the patient had a stroke, cardiac event, or stent placed in the past year? no

## 2025-02-10 ENCOUNTER — TELEPHONE (OUTPATIENT)
Dept: GASTROENTEROLOGY | Facility: CLINIC | Age: 45
End: 2025-02-10

## 2025-02-10 NOTE — TELEPHONE ENCOUNTER
Lm to call back to reschedule colonoscopy for 08/04/25 with Dr Jacques due to change in  schedule or same day with Dr Tubbs.    Lm 02/07/25  Lm 02/10/25